# Patient Record
Sex: FEMALE | Race: BLACK OR AFRICAN AMERICAN | Employment: FULL TIME | URBAN - METROPOLITAN AREA
[De-identification: names, ages, dates, MRNs, and addresses within clinical notes are randomized per-mention and may not be internally consistent; named-entity substitution may affect disease eponyms.]

---

## 2020-02-02 ENCOUNTER — HOSPITAL ENCOUNTER (EMERGENCY)
Age: 50
Discharge: HOME | End: 2020-02-02
Attending: EMERGENCY MEDICINE
Payer: SELF-PAY

## 2020-02-02 VITALS
BODY MASS INDEX: 34.36 KG/M2 | HEART RATE: 76 BPM | SYSTOLIC BLOOD PRESSURE: 169 MMHG | DIASTOLIC BLOOD PRESSURE: 76 MMHG | RESPIRATION RATE: 18 BRPM | HEIGHT: 69 IN | WEIGHT: 232 LBS | TEMPERATURE: 97.6 F | OXYGEN SATURATION: 98 %

## 2020-02-02 DIAGNOSIS — L25.3 CONTACT DERMATITIS DUE TO CHEMICALS: Primary | ICD-10-CM

## 2020-02-02 LAB
ALBUMIN SERPL-MCNC: 3 G/DL (ref 3.5–5)
ALBUMIN/GLOB SERPL: 0.6 {RATIO} (ref 1.2–3.5)
ALP SERPL-CCNC: 139 U/L (ref 50–136)
ALT SERPL-CCNC: 19 U/L (ref 12–65)
ANION GAP SERPL CALC-SCNC: 8 MMOL/L (ref 7–16)
AST SERPL-CCNC: 31 U/L (ref 15–37)
BILIRUB SERPL-MCNC: 0.6 MG/DL (ref 0.2–1.1)
BUN SERPL-MCNC: 14 MG/DL (ref 6–23)
CALCIUM SERPL-MCNC: 8.9 MG/DL (ref 8.3–10.4)
CHLORIDE SERPL-SCNC: 109 MMOL/L (ref 98–107)
CO2 SERPL-SCNC: 24 MMOL/L (ref 21–32)
CREAT SERPL-MCNC: 1 MG/DL (ref 0.6–1)
GLOBULIN SER CALC-MCNC: 5 G/DL (ref 2.3–3.5)
GLUCOSE SERPL-MCNC: 111 MG/DL (ref 65–100)
POTASSIUM SERPL-SCNC: 4.3 MMOL/L (ref 3.5–5.1)
PROT SERPL-MCNC: 8 G/DL (ref 6.3–8.2)
SODIUM SERPL-SCNC: 141 MMOL/L (ref 136–145)

## 2020-02-02 PROCEDURE — 80053 COMPREHEN METABOLIC PANEL: CPT

## 2020-02-02 PROCEDURE — 99283 EMERGENCY DEPT VISIT LOW MDM: CPT

## 2020-02-02 PROCEDURE — 96374 THER/PROPH/DIAG INJ IV PUSH: CPT

## 2020-02-02 PROCEDURE — 96375 TX/PRO/DX INJ NEW DRUG ADDON: CPT

## 2020-02-02 PROCEDURE — 74011250636 HC RX REV CODE- 250/636: Performed by: EMERGENCY MEDICINE

## 2020-02-02 RX ORDER — KETOROLAC TROMETHAMINE 30 MG/ML
30 INJECTION, SOLUTION INTRAMUSCULAR; INTRAVENOUS
Status: COMPLETED | OUTPATIENT
Start: 2020-02-02 | End: 2020-02-02

## 2020-02-02 RX ORDER — DIPHENHYDRAMINE HYDROCHLORIDE 50 MG/ML
25 INJECTION, SOLUTION INTRAMUSCULAR; INTRAVENOUS
Status: COMPLETED | OUTPATIENT
Start: 2020-02-02 | End: 2020-02-02

## 2020-02-02 RX ORDER — HYDROCODONE BITARTRATE AND ACETAMINOPHEN 5; 325 MG/1; MG/1
1 TABLET ORAL
Qty: 12 TAB | Refills: 0 | Status: SHIPPED | OUTPATIENT
Start: 2020-02-02 | End: 2020-02-08

## 2020-02-02 RX ORDER — PREDNISONE 50 MG/1
50 TABLET ORAL DAILY
Qty: 7 TAB | Refills: 0 | Status: SHIPPED | OUTPATIENT
Start: 2020-02-02 | End: 2020-02-08

## 2020-02-02 RX ADMIN — DIPHENHYDRAMINE HYDROCHLORIDE 25 MG: 50 INJECTION, SOLUTION INTRAMUSCULAR; INTRAVENOUS at 10:58

## 2020-02-02 RX ADMIN — METHYLPREDNISOLONE SODIUM SUCCINATE 125 MG: 125 INJECTION, POWDER, FOR SOLUTION INTRAMUSCULAR; INTRAVENOUS at 10:58

## 2020-02-02 RX ADMIN — KETOROLAC TROMETHAMINE 30 MG: 30 INJECTION, SOLUTION INTRAMUSCULAR at 10:58

## 2020-02-02 NOTE — DISCHARGE INSTRUCTIONS
Patient Education        Dermatitis: Care Instructions  Your Care Instructions  Dermatitis is the general name used for any rash or inflammation of the skin. Different kinds of dermatitis cause different kinds of rashes. Common causes of a rash include new medicines, plants (such as poison oak or poison ivy), heat, and stress. Certain illnesses can also cause a rash. An allergic reaction to something that touches your skin, such as latex, nickel, or poison ivy, is called contact dermatitis. Contact dermatitis may also be caused by something that irritates the skin, such as bleach, a chemical, or soap. These types of rashes cannot be spread from person to person. How long your rash will last depends on what caused it. Rashes may last a few days or months. Follow-up care is a key part of your treatment and safety. Be sure to make and go to all appointments, and call your doctor if you are having problems. It's also a good idea to know your test results and keep a list of the medicines you take. How can you care for yourself at home? · Do not scratch the rash. Cut your nails short, and file them smooth. Or wear gloves if this helps keep you from scratching. · Wash the area with water only. Pat dry. · Put cold, wet cloths on the rash to reduce itching. · Keep cool, and stay out of the sun. · Leave the rash open to the air as much as possible. · If the rash itches, use hydrocortisone cream. Follow the directions on the label. Calamine lotion may help for plant rashes. · Take an over-the-counter antihistamine, such as diphenhydramine (Benadryl) or loratadine (Claritin), to help calm the itching. Read and follow all instructions on the label. · If your doctor prescribed a cream, use it as directed. If your doctor prescribed medicine, take it exactly as directed. When should you call for help?   Call your doctor now or seek immediate medical care if:    · You have symptoms of infection, such as:  ? Increased pain, swelling, warmth, or redness. ? Red streaks leading from the area. ? Pus draining from the area. ? A fever.     · You have joint pain along with the rash.    Watch closely for changes in your health, and be sure to contact your doctor if:    · Your rash is changing or getting worse.     · You are not getting better as expected. Where can you learn more? Go to http://javad-mg.info/. Enter [phone] in the search box to learn more about \"Dermatitis: Care Instructions. \"  Current as of: April 1, 2019  Content Version: 12.2  © 9665-9780 [employer], [employer]. Care instructions adapted under license by [employer] (which disclaims liability or warranty for this information). If you have questions about a medical condition or this instruction, always ask your healthcare professional. [address] any warranty or liability for your use of this information.

## 2020-02-02 NOTE — ED TRIAGE NOTES
Pt arrives to triage via w/c. Pt states she dyed her hair black on Friday and yesterday her head was red and swollen and put cortisone cream on her head. Pt states when she woke up this morning the swelling is worse. Pt has swelling to eyes and nose with scratchy throat. Tonsils are mildly swollen, but are absent of redness or exudate. Pt states she has never used this particular brand before. Pt states she has some blistering on her head. Pt states she has a HA.

## 2020-02-02 NOTE — ED PROVIDER NOTES
Pt arrives to triage via w/c. Pt states she dyed her hair black on Friday and yesterday her head was red and swollen and put cortisone cream on her head. Pt states when she woke up this morning the swelling is worse. Pt has swelling to eyes and nose with scratchy throat. Tonsils are mildly swollen, but are absent of redness or exudate. Pt states she has never used this particular brand before. Pt states she has some blistering on her head. Pt states she has a HA. The history is provided by the patient. Rash    This is a new problem. The current episode started 2 days ago. The problem has been gradually worsening. Associated with: hair dye. There has been no fever. The rash is present on the scalp. The pain is at a severity of 10/10. The pain is severe. The pain has been constant since onset. Associated symptoms include blisters, itching and weeping. She has tried cold cream and steroid cream for the symptoms. The treatment provided no relief. Past Medical History:   Diagnosis Date    Gastrointestinal disorder     ulcers    Hypertension        Past Surgical History:   Procedure Laterality Date    HX CHOLECYSTECTOMY      HX GYN      tubal    HX ORTHOPAEDIC      tubal ligation          No family history on file.     Social History     Socioeconomic History    Marital status: SINGLE     Spouse name: Not on file    Number of children: Not on file    Years of education: Not on file    Highest education level: Not on file   Occupational History    Not on file   Social Needs    Financial resource strain: Not on file    Food insecurity:     Worry: Not on file     Inability: Not on file    Transportation needs:     Medical: Not on file     Non-medical: Not on file   Tobacco Use    Smoking status: Current Every Day Smoker     Packs/day: 0.25   Substance and Sexual Activity    Alcohol use: Yes     Comment: social    Drug use: No    Sexual activity: Not on file   Lifestyle    Physical activity: Days per week: Not on file     Minutes per session: Not on file    Stress: Not on file   Relationships    Social connections:     Talks on phone: Not on file     Gets together: Not on file     Attends Baptism service: Not on file     Active member of club or organization: Not on file     Attends meetings of clubs or organizations: Not on file     Relationship status: Not on file    Intimate partner violence:     Fear of current or ex partner: Not on file     Emotionally abused: Not on file     Physically abused: Not on file     Forced sexual activity: Not on file   Other Topics Concern    Not on file   Social History Narrative    Not on file         ALLERGIES: Patient has no known allergies. Review of Systems   Skin: Positive for itching and rash. All other systems reviewed and are negative. Vitals:    02/02/20 1013   BP: (!) 174/93   Pulse: 74   Resp: 16   Temp: 97.5 °F (36.4 °C)   SpO2: 97%   Weight: 105.2 kg (232 lb)   Height: 5' 9\" (1.753 m)            Physical Exam  Vitals signs and nursing note reviewed. Constitutional:       General: She is not in acute distress. Appearance: Normal appearance. She is not ill-appearing, toxic-appearing or diaphoretic. HENT:      Head: Normocephalic and atraumatic. Comments: There is some mild swelling noted to the facial tissues including the upper eyelids. Right Ear: Tympanic membrane normal.      Left Ear: Tympanic membrane normal.      Mouth/Throat:      Mouth: Mucous membranes are moist.      Pharynx: Oropharynx is clear. No oropharyngeal exudate or posterior oropharyngeal erythema. Eyes:      Extraocular Movements: Extraocular movements intact. Conjunctiva/sclera: Conjunctivae normal.      Pupils: Pupils are equal, round, and reactive to light. Cardiovascular:      Rate and Rhythm: Normal rate and regular rhythm. Heart sounds: Normal heart sounds.    Pulmonary:      Effort: Pulmonary effort is normal.      Breath sounds: Normal breath sounds. Skin:     General: Skin is warm. Comments: There is generalized rash involving the scalp with raised lesions and weeping clear fluid. No evidence of cellulitis or infection. Neurological:      General: No focal deficit present. Mental Status: She is alert and oriented to person, place, and time. Mental status is at baseline.    Psychiatric:         Mood and Affect: Mood normal.         Behavior: Behavior normal.          MDM  Number of Diagnoses or Management Options     Amount and/or Complexity of Data Reviewed  Clinical lab tests: reviewed    Risk of Complications, Morbidity, and/or Mortality  Presenting problems: low  Diagnostic procedures: low  Management options: low    Patient Progress  Patient progress: stable         Procedures

## 2020-02-02 NOTE — ED NOTES
I have reviewed discharge instructions with the patient. The patient verbalized understanding. Patient left ED via Discharge Method: ambulatory to Home with friend. Opportunity for questions and clarification provided. Patient given 2 scripts. To continue your aftercare when you leave the hospital, you may receive an automated call from our care team to check in on how you are doing. This is a free service and part of our promise to provide the best care and service to meet your aftercare needs.  If you have questions, or wish to unsubscribe from this service please call [phone]. Thank you for Choosing our [facility] Emergency Department.

## 2020-02-04 ENCOUNTER — APPOINTMENT (OUTPATIENT)
Dept: CT IMAGING | Age: 50
DRG: 066 | End: 2020-02-04
Attending: EMERGENCY MEDICINE
Payer: SELF-PAY

## 2020-02-04 ENCOUNTER — HOSPITAL ENCOUNTER (INPATIENT)
Age: 50
LOS: 2 days | Discharge: HOME HEALTH | DRG: 066 | End: 2020-02-08
Attending: EMERGENCY MEDICINE | Admitting: FAMILY MEDICINE
Payer: SELF-PAY

## 2020-02-04 DIAGNOSIS — I63.9 BRAINSTEM STROKE (HCC): ICD-10-CM

## 2020-02-04 DIAGNOSIS — R11.2 NON-INTRACTABLE VOMITING WITH NAUSEA, UNSPECIFIED VOMITING TYPE: ICD-10-CM

## 2020-02-04 DIAGNOSIS — I16.0 HYPERTENSIVE URGENCY: Primary | ICD-10-CM

## 2020-02-04 DIAGNOSIS — R42 VERTIGO: ICD-10-CM

## 2020-02-04 PROCEDURE — 96372 THER/PROPH/DIAG INJ SC/IM: CPT

## 2020-02-04 PROCEDURE — 99285 EMERGENCY DEPT VISIT HI MDM: CPT

## 2020-02-04 PROCEDURE — 93005 ELECTROCARDIOGRAM TRACING: CPT | Performed by: EMERGENCY MEDICINE

## 2020-02-04 PROCEDURE — 81003 URINALYSIS AUTO W/O SCOPE: CPT

## 2020-02-04 PROCEDURE — 74011250636 HC RX REV CODE- 250/636: Performed by: EMERGENCY MEDICINE

## 2020-02-04 PROCEDURE — 83690 ASSAY OF LIPASE: CPT

## 2020-02-04 PROCEDURE — 80053 COMPREHEN METABOLIC PANEL: CPT

## 2020-02-04 PROCEDURE — 85025 COMPLETE CBC W/AUTO DIFF WBC: CPT

## 2020-02-04 PROCEDURE — 70450 CT HEAD/BRAIN W/O DYE: CPT

## 2020-02-04 RX ORDER — MECLIZINE HYDROCHLORIDE 25 MG/1
50 TABLET ORAL
Status: COMPLETED | OUTPATIENT
Start: 2020-02-04 | End: 2020-02-04

## 2020-02-04 RX ORDER — PROMETHAZINE HYDROCHLORIDE 25 MG/ML
25 INJECTION, SOLUTION INTRAMUSCULAR; INTRAVENOUS
Status: COMPLETED | OUTPATIENT
Start: 2020-02-04 | End: 2020-02-04

## 2020-02-04 RX ADMIN — PROMETHAZINE HYDROCHLORIDE 25 MG: 25 INJECTION INTRAMUSCULAR; INTRAVENOUS at 23:59

## 2020-02-04 RX ADMIN — SODIUM CHLORIDE 1000 ML: 900 INJECTION, SOLUTION INTRAVENOUS at 23:59

## 2020-02-04 RX ADMIN — MECLIZINE HYDROCHLORIDE 50 MG: 25 TABLET ORAL at 23:59

## 2020-02-05 ENCOUNTER — APPOINTMENT (OUTPATIENT)
Dept: CT IMAGING | Age: 50
DRG: 066 | End: 2020-02-05
Attending: INTERNAL MEDICINE
Payer: SELF-PAY

## 2020-02-05 ENCOUNTER — APPOINTMENT (OUTPATIENT)
Dept: MRI IMAGING | Age: 50
DRG: 066 | End: 2020-02-05
Attending: INTERNAL MEDICINE
Payer: SELF-PAY

## 2020-02-05 PROBLEM — I63.9 CVA (CEREBRAL VASCULAR ACCIDENT) (HCC): Status: ACTIVE | Noted: 2020-02-05

## 2020-02-05 LAB
ALBUMIN SERPL-MCNC: 3.6 G/DL (ref 3.5–5)
ALBUMIN/GLOB SERPL: 0.8 {RATIO} (ref 1.2–3.5)
ALP SERPL-CCNC: 145 U/L (ref 50–136)
ALT SERPL-CCNC: 33 U/L (ref 12–65)
ANION GAP SERPL CALC-SCNC: 8 MMOL/L (ref 7–16)
AST SERPL-CCNC: 21 U/L (ref 15–37)
ATRIAL RATE: 59 BPM
BASOPHILS # BLD: 0 K/UL (ref 0–0.2)
BASOPHILS NFR BLD: 0 % (ref 0–2)
BILIRUB SERPL-MCNC: 0.4 MG/DL (ref 0.2–1.1)
BUN SERPL-MCNC: 17 MG/DL (ref 6–23)
CALCIUM SERPL-MCNC: 9.4 MG/DL (ref 8.3–10.4)
CALCULATED P AXIS, ECG09: 22 DEGREES
CALCULATED R AXIS, ECG10: 46 DEGREES
CALCULATED T AXIS, ECG11: 133 DEGREES
CHLORIDE SERPL-SCNC: 103 MMOL/L (ref 98–107)
CO2 SERPL-SCNC: 28 MMOL/L (ref 21–32)
CREAT SERPL-MCNC: 0.92 MG/DL (ref 0.6–1)
DIAGNOSIS, 93000: NORMAL
DIFFERENTIAL METHOD BLD: ABNORMAL
EOSINOPHIL # BLD: 0 K/UL (ref 0–0.8)
EOSINOPHIL NFR BLD: 0 % (ref 0.5–7.8)
ERYTHROCYTE [DISTWIDTH] IN BLOOD BY AUTOMATED COUNT: 16.4 % (ref 11.9–14.6)
GLOBULIN SER CALC-MCNC: 4.8 G/DL (ref 2.3–3.5)
GLUCOSE BLD STRIP.AUTO-MCNC: 108 MG/DL (ref 65–100)
GLUCOSE BLD STRIP.AUTO-MCNC: 119 MG/DL (ref 65–100)
GLUCOSE BLD STRIP.AUTO-MCNC: 125 MG/DL (ref 65–100)
GLUCOSE SERPL-MCNC: 141 MG/DL (ref 65–100)
HCT VFR BLD AUTO: 40.9 % (ref 35.8–46.3)
HGB BLD-MCNC: 13.1 G/DL (ref 11.7–15.4)
IMM GRANULOCYTES # BLD AUTO: 0.1 K/UL (ref 0–0.5)
IMM GRANULOCYTES NFR BLD AUTO: 1 % (ref 0–5)
LIPASE SERPL-CCNC: 75 U/L (ref 73–393)
LYMPHOCYTES # BLD: 1.6 K/UL (ref 0.5–4.6)
LYMPHOCYTES NFR BLD: 15 % (ref 13–44)
MCH RBC QN AUTO: 28.2 PG (ref 26.1–32.9)
MCHC RBC AUTO-ENTMCNC: 32 G/DL (ref 31.4–35)
MCV RBC AUTO: 88 FL (ref 79.6–97.8)
MONOCYTES # BLD: 0.3 K/UL (ref 0.1–1.3)
MONOCYTES NFR BLD: 3 % (ref 4–12)
NEUTS SEG # BLD: 8.6 K/UL (ref 1.7–8.2)
NEUTS SEG NFR BLD: 81 % (ref 43–78)
NRBC # BLD: 0 K/UL (ref 0–0.2)
P-R INTERVAL, ECG05: 202 MS
PLATELET # BLD AUTO: 253 K/UL (ref 150–450)
PMV BLD AUTO: 10.6 FL (ref 9.4–12.3)
POTASSIUM SERPL-SCNC: 3.3 MMOL/L (ref 3.5–5.1)
PROT SERPL-MCNC: 8.4 G/DL (ref 6.3–8.2)
Q-T INTERVAL, ECG07: 470 MS
QRS DURATION, ECG06: 96 MS
QTC CALCULATION (BEZET), ECG08: 465 MS
RBC # BLD AUTO: 4.65 M/UL (ref 4.05–5.2)
SODIUM SERPL-SCNC: 139 MMOL/L (ref 136–145)
VENTRICULAR RATE, ECG03: 59 BPM
WBC # BLD AUTO: 10.6 K/UL (ref 4.3–11.1)

## 2020-02-05 PROCEDURE — 74011250637 HC RX REV CODE- 250/637: Performed by: FAMILY MEDICINE

## 2020-02-05 PROCEDURE — 99218 HC RM OBSERVATION: CPT

## 2020-02-05 PROCEDURE — 70551 MRI BRAIN STEM W/O DYE: CPT

## 2020-02-05 PROCEDURE — 74011250636 HC RX REV CODE- 250/636: Performed by: INTERNAL MEDICINE

## 2020-02-05 PROCEDURE — 74011250637 HC RX REV CODE- 250/637: Performed by: INTERNAL MEDICINE

## 2020-02-05 PROCEDURE — 82962 GLUCOSE BLOOD TEST: CPT

## 2020-02-05 PROCEDURE — 74011000258 HC RX REV CODE- 258: Performed by: INTERNAL MEDICINE

## 2020-02-05 PROCEDURE — 97166 OT EVAL MOD COMPLEX 45 MIN: CPT

## 2020-02-05 PROCEDURE — 74011636320 HC RX REV CODE- 636/320: Performed by: INTERNAL MEDICINE

## 2020-02-05 PROCEDURE — 70496 CT ANGIOGRAPHY HEAD: CPT

## 2020-02-05 PROCEDURE — 97162 PT EVAL MOD COMPLEX 30 MIN: CPT

## 2020-02-05 PROCEDURE — 96374 THER/PROPH/DIAG INJ IV PUSH: CPT

## 2020-02-05 PROCEDURE — 92610 EVALUATE SWALLOWING FUNCTION: CPT

## 2020-02-05 PROCEDURE — 74011250636 HC RX REV CODE- 250/636: Performed by: EMERGENCY MEDICINE

## 2020-02-05 PROCEDURE — 93306 TTE W/DOPPLER COMPLETE: CPT

## 2020-02-05 PROCEDURE — 96372 THER/PROPH/DIAG INJ SC/IM: CPT

## 2020-02-05 PROCEDURE — 99204 OFFICE O/P NEW MOD 45 MIN: CPT | Performed by: PSYCHIATRY & NEUROLOGY

## 2020-02-05 RX ORDER — AMLODIPINE BESYLATE 10 MG/1
10 TABLET ORAL DAILY
Status: DISCONTINUED | OUTPATIENT
Start: 2020-02-06 | End: 2020-02-08 | Stop reason: HOSPADM

## 2020-02-05 RX ORDER — ENOXAPARIN SODIUM 100 MG/ML
40 INJECTION SUBCUTANEOUS EVERY 24 HOURS
Status: DISCONTINUED | OUTPATIENT
Start: 2020-02-05 | End: 2020-02-08 | Stop reason: HOSPADM

## 2020-02-05 RX ORDER — HYDRALAZINE HYDROCHLORIDE 50 MG/1
75 TABLET, FILM COATED ORAL 3 TIMES DAILY
COMMUNITY

## 2020-02-05 RX ORDER — CHLORTHALIDONE 25 MG/1
25 TABLET ORAL DAILY
COMMUNITY

## 2020-02-05 RX ORDER — ATORVASTATIN CALCIUM 80 MG/1
80 TABLET, FILM COATED ORAL
COMMUNITY

## 2020-02-05 RX ORDER — SODIUM CHLORIDE 0.9 % (FLUSH) 0.9 %
5-40 SYRINGE (ML) INJECTION EVERY 8 HOURS
Status: DISCONTINUED | OUTPATIENT
Start: 2020-02-05 | End: 2020-02-08 | Stop reason: HOSPADM

## 2020-02-05 RX ORDER — SODIUM CHLORIDE 0.9 % (FLUSH) 0.9 %
5-40 SYRINGE (ML) INJECTION AS NEEDED
Status: DISCONTINUED | OUTPATIENT
Start: 2020-02-05 | End: 2020-02-08 | Stop reason: HOSPADM

## 2020-02-05 RX ORDER — SODIUM CHLORIDE 0.9 % (FLUSH) 0.9 %
10 SYRINGE (ML) INJECTION
Status: COMPLETED | OUTPATIENT
Start: 2020-02-05 | End: 2020-02-05

## 2020-02-05 RX ORDER — ASPIRIN 325 MG
325 TABLET ORAL ONCE
Status: COMPLETED | OUTPATIENT
Start: 2020-02-05 | End: 2020-02-05

## 2020-02-05 RX ORDER — SODIUM CHLORIDE 0.9 % (FLUSH) 0.9 %
10 SYRINGE (ML) INJECTION
Status: ACTIVE | OUTPATIENT
Start: 2020-02-05 | End: 2020-02-05

## 2020-02-05 RX ORDER — GUAIFENESIN 100 MG/5ML
81 LIQUID (ML) ORAL DAILY
Status: DISCONTINUED | OUTPATIENT
Start: 2020-02-06 | End: 2020-02-08 | Stop reason: HOSPADM

## 2020-02-05 RX ORDER — ACETAMINOPHEN 325 MG/1
650 TABLET ORAL
Status: DISCONTINUED | OUTPATIENT
Start: 2020-02-05 | End: 2020-02-08 | Stop reason: HOSPADM

## 2020-02-05 RX ORDER — ATORVASTATIN CALCIUM 40 MG/1
40 TABLET, FILM COATED ORAL
Status: DISCONTINUED | OUTPATIENT
Start: 2020-02-05 | End: 2020-02-08 | Stop reason: HOSPADM

## 2020-02-05 RX ORDER — LOSARTAN POTASSIUM 50 MG/1
100 TABLET ORAL DAILY
Status: DISCONTINUED | OUTPATIENT
Start: 2020-02-06 | End: 2020-02-08 | Stop reason: HOSPADM

## 2020-02-05 RX ORDER — HYDRALAZINE HYDROCHLORIDE 20 MG/ML
20 INJECTION INTRAMUSCULAR; INTRAVENOUS
Status: COMPLETED | OUTPATIENT
Start: 2020-02-05 | End: 2020-02-05

## 2020-02-05 RX ORDER — LABETALOL HYDROCHLORIDE 5 MG/ML
5 INJECTION, SOLUTION INTRAVENOUS
Status: DISCONTINUED | OUTPATIENT
Start: 2020-02-05 | End: 2020-02-08 | Stop reason: HOSPADM

## 2020-02-05 RX ORDER — CARVEDILOL 25 MG/1
25 TABLET ORAL 2 TIMES DAILY WITH MEALS
Status: DISCONTINUED | OUTPATIENT
Start: 2020-02-05 | End: 2020-02-08 | Stop reason: HOSPADM

## 2020-02-05 RX ORDER — CHLORTHALIDONE 25 MG/1
25 TABLET ORAL DAILY
Status: DISCONTINUED | OUTPATIENT
Start: 2020-02-06 | End: 2020-02-08 | Stop reason: HOSPADM

## 2020-02-05 RX ORDER — CARVEDILOL 25 MG/1
25 TABLET ORAL 2 TIMES DAILY WITH MEALS
COMMUNITY

## 2020-02-05 RX ORDER — AMLODIPINE BESYLATE 10 MG/1
10 TABLET ORAL DAILY
COMMUNITY

## 2020-02-05 RX ORDER — LOSARTAN POTASSIUM 100 MG/1
100 TABLET ORAL DAILY
COMMUNITY

## 2020-02-05 RX ORDER — HYDROCODONE BITARTRATE AND ACETAMINOPHEN 5; 325 MG/1; MG/1
1 TABLET ORAL
Status: DISCONTINUED | OUTPATIENT
Start: 2020-02-05 | End: 2020-02-08 | Stop reason: HOSPADM

## 2020-02-05 RX ADMIN — Medication 10 ML: at 06:18

## 2020-02-05 RX ADMIN — ENOXAPARIN SODIUM 40 MG: 40 INJECTION SUBCUTANEOUS at 06:06

## 2020-02-05 RX ADMIN — Medication 10 ML: at 21:16

## 2020-02-05 RX ADMIN — Medication 10 ML: at 08:21

## 2020-02-05 RX ADMIN — ATORVASTATIN CALCIUM 40 MG: 40 TABLET, FILM COATED ORAL at 21:15

## 2020-02-05 RX ADMIN — HYDRALAZINE HYDROCHLORIDE 75 MG: 50 TABLET, FILM COATED ORAL at 21:15

## 2020-02-05 RX ADMIN — SODIUM CHLORIDE 100 ML: 900 INJECTION, SOLUTION INTRAVENOUS at 08:22

## 2020-02-05 RX ADMIN — IOPAMIDOL 50 ML: 755 INJECTION, SOLUTION INTRAVENOUS at 08:22

## 2020-02-05 RX ADMIN — ASPIRIN 325 MG ORAL TABLET 325 MG: 325 PILL ORAL at 06:06

## 2020-02-05 RX ADMIN — Medication 10 ML: at 14:00

## 2020-02-05 RX ADMIN — HYDRALAZINE HYDROCHLORIDE 20 MG: 20 INJECTION INTRAMUSCULAR; INTRAVENOUS at 00:59

## 2020-02-05 RX ADMIN — CARVEDILOL 25 MG: 25 TABLET, FILM COATED ORAL at 18:52

## 2020-02-05 NOTE — PROGRESS NOTES
Problem: Self Care Deficits Care Plan (Adult)  Goal: *Acute Goals and Plan of Care (Insert Text)  Description  LTG 1: Pt will be I with toileting within 6 visits to prevent skin breakdown. LTG 2: Pt will be I with LB dressing within 6 visits to reduce risk of falls. LTG 3: Pt will be I with bathing within 6 visits to promote good skin integrity. LTG 4: Pt will be I with toilet transfers within 6 visits to promote quality of life. LTG 5: Pt will be be able to stand for 5 minutes with no medical concerns within 6 visits to participate in simple IADL. OCCUPATIONAL THERAPY: Initial Assessment 2/5/2020  OBSERVATION: OT Visit Days: (0)  Payor: SELF PAY / Plan: [facility] SELF PAY / Product Type: Self Pay /      NAME/AGE/GENDER: Nicolas Jaquez is a 44 y.o. female   PRIMARY DIAGNOSIS:  CVA (cerebral vascular accident) ([facility] [address] [I63.9] <principal problem not specified> <principal problem not specified>        ICD-10: Treatment Diagnosis:    Generalized Muscle Weakness (M62.81)   Precautions/Allergies:     Patient has no known allergies. ASSESSMENT:     Ms. Adebayo Nielson presents in bed and agreeable to tx. Pt's RN mentioned her BP increased with movement. Pt's lives with her son and was working until a week ago when her assignment ended. She was I with all self-care, driving, and medication management. Pt's BP in sitting was 179/89. In sitting MMT completed with generalized strength of 4/5 with LUE coordination with slight deficit. Pt reported double vision in sitting. BP retested at 207/146. Standing deferred at this time. Pt could benefit from skilled services to address coordination deficits and address standing when appropriate.      This section established at most recent assessment   PROBLEM LIST (Impairments causing functional limitations):  Decreased ADL/Functional Activities  Decreased Ambulation Ability/Technique  Decreased Balance   INTERVENTIONS PLANNED: (Benefits and precautions of occupational therapy have been discussed with the patient.)  Activities of daily living training  Neuromuscular re-eduation  Therapeutic activity  Therapeutic exercise     TREATMENT PLAN: Frequency/Duration: Follow patient 3x a week to address above goals. Rehabilitation Potential For Stated Goals: Excellent     REHAB RECOMMENDATIONS (at time of discharge pending progress):    Placement: It is my opinion, based on this patient's performance to date, that Ms. Kyle Trinidad may benefit from participating in 1-2 additional therapy sessions in order to continue to assess for rehab potential and then make recommendation for disposition at discharge. Equipment:   None at this time              OCCUPATIONAL PROFILE AND HISTORY:   History of Present Injury/Illness (Reason for Referral):  See H&P  Past Medical History/Comorbidities:   Ms. Kyle Trinidad  has a past medical history of CVA (cerebral vascular accident) ([facility] [address] (2/5/2020), Gastrointestinal disorder, and Hypertension. She also has no past medical history of CAD (coronary artery disease), Cancer ([facility] [address] Chronic kidney disease, or Diabetes ([facility] [address] Ms. Kyle Trinidad  has a past surgical history that includes hx orthopaedic; hx cholecystectomy; and hx gyn. Social History/Living Environment:   Home Environment: Private residence  # Steps to Enter: 3  Rails to Enter: Yes  Hand Rails : Bilateral  One/Two Story Residence: One story  Living Alone: No  Support Systems: Child(prabhjot)  Patient Expects to be Discharged to[de-identified] (Unknown)  Current DME Used/Available at Home: Walker  Prior Level of Function/Work/Activity:  Pt was I and working until a week ago when her assignment ended.     Number of Personal Factors/Comorbidities that affect the Plan of Care: Expanded review of therapy/medical records (1-2):  MODERATE COMPLEXITY   ASSESSMENT OF OCCUPATIONAL PERFORMANCE[de-identified]   Activities of Daily Living:   Basic ADLs (From Assessment) Complex ADLs (From Assessment)   Feeding: Independent  Oral Facial Hygiene/Grooming: Setup  Bathing: Moderate assistance  Upper Body Dressing: Setup  Lower Body Dressing: Moderate assistance  Toileting: Total assistance     Grooming/Bathing/Dressing Activities of Daily Living                             Bed/Mat Mobility  Supine to Sit: Independent  Scooting: Independent     Most Recent Physical Functioning:   Gross Assessment:  AROM: Within functional limits  PROM: Within functional limits  Strength: Within functional limits  Coordination: Generally decreased, functional  Tone: Normal  Sensation: Intact               Posture:     Balance:  Sitting: Intact  Standing: (Unable to test secondary to BP) Bed Mobility:  Supine to Sit: Independent  Scooting: Independent  Wheelchair Mobility:     Transfers:               Patient Vitals for the past 6 hrs:   BP SpO2 Pulse   20 0911 (!) 219/108 96 % 73   20 0919 (!) 191/92 -- --   20 0923 (!) 180/92 98 % 79   20 1156 (!) 182/91 98 % 72                                  Physical Skills Involved: Activity Tolerance  Fine Motor Control  Gross Motor Control  Vision Cognitive Skills Affected (resulting in the inability to perform in a timely and safe manner):  N/A  Psychosocial Skills Affected:  N/A    Number of elements that affect the Plan of Care: 3-5:  MODERATE COMPLEXITY   CLINICAL DECISION [facility] hospitals [address] [id number] AM-PAC 6 Clicks   Daily Activity Inpatient Short Form  How much help from another person does the patient currently need. .. Total A Lot A Little None   1. Putting on and taking off regular lower body clothing? [] 1   [x] 2   [] 3   [] 4   2. Bathing (including washing, rinsing, drying)? [x] 1   [] 2   [] 3   [] 4   3. Toileting, which includes using toilet, bedpan or urinal?   [x] 1   [] 2   [] 3   [] 4   4. Putting on and taking off regular upper body clothing? [] 1   [] 2   [x] 3   [] 4   5. Taking care of personal grooming such as brushing teeth? [] 1   [] 2   [x] 3   [] 4   6. Eating meals?    [] 1   [] 2   [x] 3   [] 4   © 2007, Trustees of [address] [place] [address] [address] [address], under license to [employer]. All rights reserved      Score:  Initial: 13 Most Recent: X (Date: -- )    Interpretation of Tool:  Represents activities that are increasingly more difficult (i.e. Bed mobility, Transfers, Gait). Medical Necessity:     Skilled intervention continues to be required due to inability to stand safely. Reason for Services/Other Comments:  Patient continues to require skilled intervention due to   Need to return home   . Use of outcome tool(s) and clinical judgement create a POC that gives a: MODERATE COMPLEXITY         TREATMENT:   (In addition to Assessment/Re-Assessment sessions the following treatments were rendered)     Pre-treatment Symptoms/Complaints:    Pain: Initial:     No c/o Post Session:  No c/o     Assessment/Reassessment only, no treatment provided today    Braces/Orthotics/Lines/Etc:   O2 Device: Room air  Treatment/Session Assessment:    Response to Treatment:  Agreeable  Interdisciplinary Collaboration:   Registered Nurse  After treatment position/precautions:   Bed/Chair-wheels locked  Bed in low position  Call light within reach  Side rails x 2   Compliance with Program/Exercises: Will assess as treatment progresses. Recommendations/Intent for next treatment session: \"Next visit will focus on advancements to more challenging activities and reduction in assistance provided\".   Total Treatment Duration:  OT Patient Time In/Time Out  Time In: 1114  Time Out: [address], OT

## 2020-02-05 NOTE — PROGRESS NOTES
Admitted after midnight with stroke-like symptoms including dizziness and nystagmus. Seen and examined. Symptoms improving, still present. Seen by neurology, appreciate evaluation. MRI negative for stroke, plan possible repeat with contrast if sx do not resolve. Restart home antihypertensives. Skin reaction improved, will stop benadryl and steroid per pt preference unless worsens.

## 2020-02-05 NOTE — PROGRESS NOTES
Problem: Mobility Impaired (Adult and Pediatric)  Goal: *Acute Goals and Plan of Care (Insert Text)  Description  LTG:  (1.)Ms. Linda Munoz will move from supine to sit and sit to supine, scoot up and down and roll side to side INDEPENDENTLY with bed flat within 7 treatment day(s). (2.)Ms. Linda Munoz will transfer from bed to chair and chair to bed INDEPENDENTLY within 7 treatment day(s). (3.)Ms. Fernando will ambulate with SUPERVISION for 250+ feet with the least restrictive device within 7 treatment day(s). (4.)Ms. Fernando will ascend and descend 4 steps with STAND BY ASSIST using handrail(s) within 7 days. ________________________________________________________________________________________________   Outcome: Progressing Towards Goal     PHYSICAL THERAPY: Initial Assessment and PM 2/5/2020  OBSERVATION:    Payor: SELF PAY / Plan: [facility] SELF PAY / Product Type: Self Pay /       NAME/AGE/GENDER: Rae Gonzales is a 44 y.o. female   PRIMARY DIAGNOSIS: CVA (cerebral vascular accident) ([facility] [I63.9] <principal problem not specified> <principal problem not specified>        ICD-10: Treatment Diagnosis:    Generalized Muscle Weakness (M62.81)  Difficulty in walking, Not elsewhere classified (R26.2)  Other abnormalities of gait and mobility (R26.89)   Precaution/Allergies:  Patient has no known allergies. ASSESSMENT:     Ms. Linda Munoz is a 44year old female admitted from home for workup due to acute onset of dizziness, blurred vision. Was very hypertensive upon admission. At baseline she is fully independent with mobility, ambulation, and ADLs. Presents in supine without complaints and is agreeable to therapy evaluation, limited mobility. Dizziness and blurred vision present in supine. Transfers to sitting with supervision. Intact seated balance, reports dizziness unchanged. /93. LE assessment shows AROM WFL bilaterally with strength 4+/5, sensation intact and equal B LEs.  Min assist for sit-stand transfer and constant min assist for safety during standing. Keeps eyes closed stating this helps dizziness. Pt declined further mobility at this time due to continued symptoms and feeling poorly overall. Returned to supine with supervision, left with needs in reach, RN notified. Aline Siu is functioning well below baseline with mobility and is most limited by acute onset of dizziness/blurred vision. Unclear dc needs pending progress with therapy and resolution of symptoms. This section established at most recent assessment   PROBLEM LIST (Impairments causing functional limitations):  Decreased Strength  Decreased Transfer Abilities  Decreased Ambulation Ability/Technique  Decreased Balance  Decreased Activity Tolerance   INTERVENTIONS PLANNED: (Benefits and precautions of physical therapy have been discussed with the patient.)  Balance Exercise  Bed Mobility  Gait Training  Home Exercise Program (HEP)  Therapeutic Activites  Therapeutic Exercise/Strengthening  Transfer Training     TREATMENT PLAN: Frequency/Duration: 3 times a week for duration of hospital stay  Rehabilitation Potential For Stated Goals: Good     REHAB RECOMMENDATIONS (at time of discharge pending progress):    Placement: It is my opinion, based on this patient's performance to date, that Ms. Sri Amaral may benefit from participating in 1-2 additional therapy sessions in order to continue to assess for rehab potential and then make recommendation for disposition at discharge. Equipment:   Tbd pending progress               HISTORY:   History of Present Injury/Illness (Reason for Referral):  Per H&P, \"The patient is a 24-year-old female with a past medical history of hypertension who presents emergency department complaining of dizziness. Of note patient reported to ED yesterday with symptoms of allergic reaction to hair dye, and was discharged on oral prednisone.   Patient reports she felt mildly dizzy after allergic reaction but notices significant increase in symptoms today at 5:30 PM.  Patient reports at 5:30 PM she has felt suddenly hot and sweaty. She reported she felt immediately much more dizzy and off-balance. She reports she feels like she cannot walk. She reports she feels very unsteady on her feet. She reports it feels like the room is spinning. She reports she has been feeling nauseous and has had one episode of emesis. She reports episode of double vision earlier. She denies any blood in emesis. She reports she has been eating. She reports she feels mildly short of breath. She reports increased urinary frequency. Patient denies any chest pain, palpitations, myalgias, arthralgias, weakness, numbness, or tingling\"    Past Medical History/Comorbidities:   Ms. Josh Hernandez  has a past medical history of CVA (cerebral vascular accident) ([facility] (2/5/2020), Gastrointestinal disorder, and Hypertension. She also has no past medical history of CAD (coronary artery disease), Cancer ([facility] Chronic kidney disease, or Diabetes ([facility] Ms. Josh Hernandez  has a past surgical history that includes hx orthopaedic; hx cholecystectomy; and hx gyn. Social History/Living Environment:   Home Environment: Private residence  # Steps to Enter: 4  Rails to Enter: Yes  Hand Rails : Bilateral  Wheelchair Ramp: No  One/Two Story Residence: One story  Living Alone: No  Support Systems: Child(prabhjot)  Patient Expects to be Discharged to[de-identified] Private residence  Current DME Used/Available at Home: Walker  Prior Level of Function/Work/Activity:  Lives with son. Fully independent at baseline. Drives, works, denies DME use, no falls.       Number of Personal Factors/Comorbidities that affect the Plan of Care: 1-2: MODERATE COMPLEXITY   EXAMINATION:   Most Recent Physical Functioning:   Gross Assessment:  AROM: Within functional limits  PROM: Within functional limits  Strength: Generally decreased, functional  Coordination: Generally decreased, functional  Sensation: Intact Posture:  Posture (WDL): Exceptions to WDL  Posture Assessment: Forward head, Rounded shoulders, Trunk flexion  Balance:  Sitting: Intact  Standing: Impaired  Standing - Static: Fair(-)  Standing - Dynamic : Fair(-) Bed Mobility:  Rolling: Supervision  Supine to Sit: Supervision  Sit to Supine: Supervision  Scooting: Supervision  Wheelchair Mobility:     Transfers:  Sit to Stand: Minimum assistance  Stand to Sit: Minimum assistance  Bed to Chair: Minimum assistance  Gait:            Body Structures Involved:  Eyes and Ears Body Functions Affected:  Neuromusculoskeletal  Movement Related Activities and Participation Affected:  General Tasks and Demands  Mobility  Domestic Life  Community, Social and Civic Life   Number of elements that affect the Plan of Care: 4+: HIGH COMPLEXITY   CLINICAL PRESENTATION:   Presentation: Evolving clinical presentation with changing clinical characteristics: MODERATE COMPLEXITY   CLINICAL DECISION MAKIN [facility] Inpatient Short Form  How much difficulty does the patient currently have. .. Unable A Lot A Little None   1. Turning over in bed (including adjusting bedclothes, sheets and blankets)? [] 1   [] 2   [] 3   [x] 4   2. Sitting down on and standing up from a chair with arms ( e.g., wheelchair, bedside commode, etc.)   [] 1   [] 2   [x] 3   [] 4   3. Moving from lying on back to sitting on the side of the bed? [] 1   [] 2   [] 3   [x] 4   How much help from another person does the patient currently need. .. Total A Lot A Little None   4. Moving to and from a bed to a chair (including a wheelchair)? [] 1   [] 2   [x] 3   [] 4   5. Need to walk in hospital room? [] 1   [x] 2   [] 3   [] 4   6. Climbing 3-5 steps with a railing? [] 1   [x] 2   [] 3   [] 4   © , Trustees of [address] [place] [address] [address] [address], under license to [employer]  All rights reserved      Score:  Initial: 18 Most Recent: X (Date: -- )    Interpretation of Tool:  Represents activities that are increasingly more difficult (i.e. Bed mobility, Transfers, Gait). Medical Necessity:     Patient demonstrates   good   rehab potential due to higher previous functional level. Reason for Services/Other Comments:  Patient   continues to demonstrate capacity to improve strength, mobility, balance, transfers, and activity tolerance which will   increase independence, decrease amount of assistance required from caregiver, and increase safety  . Use of outcome tool(s) and clinical judgement create a POC that gives a: Questionable prediction of patient's progress: MODERATE COMPLEXITY            TREATMENT:   (In addition to Assessment/Re-Assessment sessions the following treatments were rendered)   Pre-treatment Symptoms/Complaints:  \"I would like to lay back down\"  Pain: Initial:   Pain Intensity 1: 0  Post Session:  0/10     Assessment/Reassessment only, no treatment provided today    Braces/Orthotics/Lines/Etc:   O2 Device: Room air  Treatment/Session Assessment:    Response to Treatment:  pt performs bed mobility with supervision, min A sit-stand. Did not ambulate d/t dizziness  Interdisciplinary Collaboration:   Physical Therapist  Registered Nurse  After treatment position/precautions:   Supine in bed  Bed/Chair-wheels locked  Bed in low position  Call light within reach  RN notified   Compliance with Program/Exercises: Will assess as treatment progresses  Recommendations/Intent for next treatment session: \"Next visit will focus on advancements to more challenging activities and reduction in assistance provided\".   Total Treatment Duration:  PT Patient Time In/Time Out  Time In: 1320  Time Out: [address], DPT

## 2020-02-05 NOTE — MED STUDENT NOTES
ATTENTION: 
MEDICAL STUDENT NOTE 
NOT FOR CLINICAL USE 
SEE ATTENDING NOTES Medical Student Progress Note SUBJECTIVE:  
Ibrahima Hess is a 44 y.o. female with a history of HTN who was admitted on 2/4/2020 for dizziness and nausea. She was first seen on 02/02 due to an allergic reaction on her head and face to a new hair dye. She presented on 02/02 with head blisters, swelling, eye swelling, and itchy throat. She was treated with toradol and prednisone and discharged home with prednisone and benadryl. The swelling improved, but on 02/04 she was sitting on her couch at home when she suddenly felt hot, started sweating profusely, had dizziness, and double/blurry vision. Also admitted to nausea, vomiting (no blood), headaches, difficulty walking due to dizziness, urinary frequency, increased thirst, and marijuana use 3-4x/week. Denied CP, SOB, abdominal pain, diarrhea, melena, alcohol intake. 02/05/20: Still having double and blurry vision, dizziness. ROS: negative except for positives above OBJECTIVE Visit Vitals BP (!) 180/92 Comment: reassessed L/arm Pulse 79 Temp 97.4 °F (36.3 °C) Resp 18 Ht 5' 9.5\" (1.765 m) Wt 105.2 kg (232 lb) SpO2 98% BMI 33.77 kg/m² General: Moderate distress HEENT: Blisters noted on forehead; horizontal nystagmus; responded \"four\" when two fingers were shown and asked how many she could see; PERRL Lungs:  CTAB Heart:  RRR Abdomen: Soft, non-tender, non-distended Extremities: No edema, pulses x4 Skin  Warm and dry Neurologic:  Alert and oriented; CN V, VII-XII grossly intact Pertinent labs, radiology: 
Troponin negative CT head, CTA head and neck, MRI brain - negative for infarction, stenosis, occlusion, aneurysms; nonspecific white matter hyperintensities seen on MRI 
 
ASSESSMENT/PLAN Ibrahima Hess is a 44 y.o. female with a history of HTN who was admitted on 2/4/2020 for dizziness and nausea. Dizziness Neurology consult Low salt diet Zofran PRN Hypertensive emergency Permissive HTN until 24 hours of symptom onset Labetalol PRN for SBP >220 or DBP >120 After 24 hours of symptom onset, restart home meds: 
Hyralazine 75 mg TID Lipitor 80 mg daily Coreg 25 mg BID Chlorthalidone 25 mg daily Norvasc 10 mg daily Cozaar 100 mg daily Clonidine 0.2 mg BID Allergic reaction Benadryl q daily Hold prednisone Urinary frequency UA Signed By: Nicky Edgar February 5, 2020 ATTENTION: 
MEDICAL STUDENT NOTE 
NOT FOR CLINICAL USE 
SEE ATTENDING NOTES *ATTENTION:  This note has been created by a medical student for educational purposes only. Please do not refer to the content of this note for clinical decision-making, billing, or other purposes. Please see attending physicians note to obtain clinical information on this patient. *

## 2020-02-05 NOTE — ROUTINE PROCESS
predniSONE (DELTASONE) tablet 50 mg   
 
Patient refusing to take. States \"It makes it harder to breath, don't feel right. \" 
 
Patient still has some swelling to eyes, denies any other swelling or discomfort. No swelling to mouth or throat noted.

## 2020-02-05 NOTE — PROGRESS NOTES
Care Management Interventions  PCP Verified by CM: Yes(Pt goes to [facility])  Mode of Transport at Discharge: Other (see comment)(family)  Transition of Care Consult (CM Consult): Discharge Planning  Discharge Durable Medical Equipment: No  Physical Therapy Consult: Yes  Occupational Therapy Consult: Yes  Speech Therapy Consult: Yes  Current Support Network: Own Home, Other(Pt and son live together)  Confirm Follow Up Transport: Family  The Plan for Transition of Care is Related to the Following Treatment Goals : home  The Patient and/or Patient Representative was Provided with a Choice of Provider and Agrees with the Discharge Plan?: (N/A)  Name of the Patient Representative Who was Provided with a Choice of Provider and Agrees with the Discharge Plan: (N.A)  Freedom of Choice List was Provided with Basic Dialogue that Supports the Patient's Individualized Plan of Care/Goals, Treatment Preferences and Shares the Quality Data Associated with the Providers?: (N/A)  [place] Resource Information Provided?: No  Discharge Location  Discharge Placement: Home      This CM met with pt at bedside this day to complete assessment. She verified that she goes to [facility] for primary care, verified she does not have insurance, confirmed her emergency contact and home address. She reports she gets her medications from [facility]. She lives at home with her son with 4 steps to enter. She has no home DME. She confirms that at baseline prior to admission she is independent with her ADLs including bathing, dressing, cooking, and driving. She confirms that representative has met with her to review financial assistance application. We discussed discharge planning this day and at this time she plans on returning home with son when stable for discharge. PT, OT, and SLP eval's ordered and will await their recommendation for post discharge needs. CM to continue to follow.

## 2020-02-05 NOTE — CONSULTS
Consult    Patient: Lashay Foss MRN: [id number]     YOB: 1970  Age: 44 y.o. Sex: female      Subjective:      Lashay Foss is a 44 y.o. female who is being seen for dizziness. The patient went to ED yesterday after an allergic reaction to hair dye. She was d/c on prednisone and benadryl and returned yesterday after dizziness worsened. She reports that she also felt hot, sweaty, and off balance. Symptoms are better today, but not resolved. MRI of brain was negative for acute changes. Past Medical History:   Diagnosis Date    CVA (cerebral vascular accident) ([facility] [address] 2/5/2020    Gastrointestinal disorder     ulcers    Hypertension      Past Surgical History:   Procedure Laterality Date    HX CHOLECYSTECTOMY      HX GYN      tubal    HX ORTHOPAEDIC      tubal ligation       No family history on file.   Social History     Tobacco Use    Smoking status: Current Every Day Smoker     Packs/day: 0.25   Substance Use Topics    Alcohol use: Yes     Comment: social      Current Facility-Administered Medications   Medication Dose Route Frequency Provider Last Rate Last Dose    HYDROcodone-acetaminophen (NORCO) 5-325 mg per tablet 1 Tab  1 Tab Oral Q4H PRN Niya Gonzales MD        predniSONE (DELTASONE) tablet 50 mg  50 mg Oral DAILY Niya Gonzales MD        sodium chloride (NS) flush 5-40 mL  5-40 mL IntraVENous Q8H Niya Gonzales MD   10 mL at 02/05/20 1400    sodium chloride (NS) flush 5-40 mL  5-40 mL IntraVENous PRN Niya Gonzales MD       [address] [place] [address] ON 2/6/2020] aspirin chewable tablet 81 mg  81 mg Oral DAILY Niya Gonzales MD        atorvastatin (LIPITOR) tablet 40 mg  40 mg Oral QHS Niya Gonzales MD        acetaminophen (TYLENOL) tablet 650 mg  650 mg Oral Q4H PRN Niya Gonzales MD        labetaloL (NORMODYNE;TRANDATE) injection 5 mg  5 mg IntraVENous Q10MIN PRN Niya Gonzales MD        enoxaparin (LOVENOX) injection 40 mg  40 mg SubCUTAneous Q24H Niya Gonzales MD   40 mg at 02/05/20 0606    sodium chloride 0.9 % bolus infusion 100 mL  100 mL IntraVENous RAD ONCE Abdiaziz Greene MD        saline peripheral flush soln 10 mL  10 mL InterCATHeter RAD Anibal Mccloud MD            No Known Allergies    Review of Systems:  CONSTITUTIONAL:  Denies weight loss, fever, chills, weakness or fatigue. HEENT:  Eyes:  Endorses blurred vision. Denies visual loss or yellow sclerae. Ears, Nose, Throat:  Denies hearing loss, tinnitus, sneezing, congestion, runny nose or sore throat. SKIN:  Denies rash or itching. CARDIOVASCULAR:  Denies chest pain, chest pressure or chest discomfort. No palpitations or edema. RESPIRATORY:  Denies shortness of breath, cough or sputum. GASTROINTESTINAL:  Denies anorexia, nausea, vomiting or diarrhea. No abdominal pain or blood. GENITOURINARY:  Denies burning with urination. NEUROLOGICAL:  Endorses dizziness and disequilibrium. Denies headache, syncope, paralysis, ataxia, numbness or tingling in the extremities. Denies change in bowel or bladder control. MUSCULOSKELETAL:  Denies muscle, back pain, joint pain or stiffness. HEMATOLOGIC:  Denies anemia, bleeding or bruising. LYMPHATICS:  Denies enlarged nodes. PSYCHIATRIC: Denies history depression or anxiety. ENDOCRINOLOGIC:  Denies reports of sweating, cold or heat intolerance. No polyuria or polydipsia. ALLERGIES:  Denies history of asthma, hives, eczema or rhinitis. Objective:     Vitals:    02/05/20 1200 02/05/20 1229 02/05/20 1330 02/05/20 1516   BP:  (!) 160/102 (!) 170/93 (!) 179/99   Pulse: 72 86  75   Resp: 18   18   Temp:    97.7 °F (36.5 °C)   SpO2:    100%   Weight:       Height:            Physical Exam:  General - Well developed, well nourished, in no apparent distress. Pleasant and conversent. HEENT - Normocephalic, atraumatic. Conjunctiva, tympanic membranes, and oropharynx are clear. Neck - Supple without masses, no bruits   Cardiovascular - Regular rate and rhythm.  Normal S1, S2 without murmurs, rubs, or gallops. Lungs - Clear to auscultation. Abdomen - Soft, nontender with normal bowel sounds. Extremities - Peripheral pulses intact. No edema and no rashes. Neurological examination - Comprehension, attention, memory and reasoning are intact. Language and speech are normal.   On cranial nerve examination, (II, III, IV, VI) pupils are equal, round, and reactive to light. Visual acuity is adequate. Visual fields are full to finger confrontation. Disconjugate gaze and horizontal nystagmus when looking to the left. (V, VII) Face is symmetric and sensation is intact to light touch. (VIII) Hearing is intact. (IX, X) Palate and uvula elevate symmetrically. Voice is normal. (XI) Shoulder shrug is strong and equal bilaterally. (XII)Tongue is midline. Motor examination - There is normal muscle tone and bulk. Power is full throughout. Muscle stretch reflexes are normoactive and there are no pathological reflexes present. Plantar response is flexor. Sensation is intact to light touch, pinprick, vibration and proprioception in all extremities.  Cerebellar examination is normal.      No results found for: CHOL, CHOLPOCT, CHOLX, CHLST, CHOLV, HDL, HDLPOC, HDLP, LDL, LDLCPOC, LDLC, DLDLP, VLDLC, VLDL, TGLX, TRIGL, TRIGP, TGLPOCT, CHHD, CHHDX     No results found for: HBA1C, HGBE8, GPQ8CGNE, DGF6EOSV       Results for orders placed or performed during the hospital encounter of 02/04/20   EKG, 12 LEAD, INITIAL   Result Value Ref Range    Ventricular Rate 59 BPM    Atrial Rate 59 BPM    P-R Interval 202 ms    QRS Duration 96 ms    Q-T Interval 470 ms    QTC Calculation (Bezet) 465 ms    Calculated P Axis 22 degrees    Calculated R Axis 46 degrees    Calculated T Axis 133 degrees    Diagnosis       Sinus bradycardia  Voltage criteria for left ventricular hypertrophy  T wave abnormality, consider lateral ischemia  Prolonged QT  Abnormal ECG  When compared with ECG of 17-AUG-2015 12:16,  Nonspecific T wave abnormality now evident in Inferior leads  T wave inversion now evident in Lateral leads  Confirmed by EMETERIO CONKLIN (), Jairo Feliz ([id number]) on 2/5/2020 12:28:06 PM          MRI Results (most recent):   Results from [place] encounter on 02/04/20   MRI BRAIN WO CONT    Narrative MRI BRAIN WITHOUT CONTRAST 2/5/2020    HISTORY: Dizziness. Stroke like symptoms. Dizziness after allergic reaction to  hair dye. TECHNIQUE: Sagittal and axial T1-weighted, axial T2-weighted, axial and coronal  FLAIR, axial T2-weighted gradient-echo, axial diffusion weighted images with ADC  maps of the brain. COMPARISON: Head CT same day    FINDINGS: There is no acute infarction, acute intracranial hemorrhage,  intra-axial mass, hydrocephalus or extra-axial hematoma. On the T2-weighted and FLAIR sequences, there are a few scattered white matter  hyperintensities. Findings are nonspecific and can be seen with chronic small  vessel ischemic disease, demyelinating disease or with migraine headaches. A few small soft tissue nodules are present in the superficial lobe of the  parotid gland. These may be lymph nodes. Correlate with physical  evaluation. Impression IMPRESSION:    1. No acute infarction. 2. Scattered nonspecific white matter hyperintensities present as described. Most recent CTA:  Results from [place] encounter on 02/04/20   CTA HEAD NECK W WO CONT    Narrative HISTORY: 44years of age Female with dizziness. EXAM: CTA HEAD NECK W WO CONT. Radiation reduction dose techniques were used  for the study. Our CT scanner use one or all of the following- Automated  exposure control, adjustment of the mA and/or KV according the patient size,  iterative reconstruction. 3-D multiplanar reformations were performed at the  workstation. All carotid artery stenosis percentages are based upon NASCET  criteria if appropriate. COMPARISON: Noncontrast CT head exam on 2/5/2020.  MR brain exam on 2/5/2020. Both examinations were without evidence of acute intracranial abnormality. FINDINGS:   VASCULAR FINDINGS:  Cervical CTA:  There is a bovine aortic arch. The right subclavian artery is patent where  visualized. The left subclavian artery is patent where visualized. The right common carotid artery is patent. The right external carotid artery is  patent. The right carotid bulb and ICA origin are patent without stenosis. The cervical  right ICA is patent. The vertebral arteries are codominant. The cervical right vertebral artery is  patent. The left common carotid artery is patent. The left external carotid artery is  patent. The left carotid bulb and ICA origin are patent without stenosis. The  cervical left ICA is patent. The cervical left vertebral artery is patent. No significant stenosis or occlusion in the major cervical arterial vasculature. Cranial CTA:  The bilateral intracranial internal carotid arteries are patent. The left A1 segment is patent. The right A1 segment is patent. The bilateral A2  and A3 segments are patent. The right M1 is patent. The major proximal right MCA branches beyond the  bifurcation are patent. The left M1 is patent. The major proximal left MCA  branches beyond the bifurcation are patent. The intracranial vertebral arteries are patent. The basilar artery is patent. The right posterior cerebral artery is patent with a fetal origin. The left  posterior cerebral artery is patent. Hypoplastic bilateral P1 segments. No evidence of intracranial aneurysms. The major dural venous sinuses are poorly  evaluated in this examination for the major intracranial arterial vasculature  but appear grossly patent. NONVASCULAR FINDINGS:  Head:  There is unremarkable brain parenchymal pattern. No evidence of enhancing masses  or focal fluid collections. The orbital structures are unremarkable.  The  calvarial and maxillofacial soft tissues are without evidence of acute  abnormality. Neck:  The thyroid gland is unremarkable. No evidence of significant cervical or upper  thoracic adenopathy. There are several bilateral periarticular and cervical  lymph nodes which are not enlarged by size criteria bilaterally. The visualized  lungs are without areas of focal consolidation. OSSEOUS STRUCTURES:  The mastoid air cells are well aerated. The paranasal sinuses are without  significant mucoperiosteal thickening. There are degenerative changes of the mid  to lower cervical spine. 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 Impression IMPRESSION:  1. Negative CTA exam of the major cervical arterial vasculature for significant  stenosis or occlusion. 2. Negative CTA exam of the major intracranial arterial vasculature for  significant stenosis, occlusion, or aneurysms. Most recent MRA:  Results from [place] encounter on 02/04/20   MRI BRAIN WO CONT    Narrative MRI BRAIN WITHOUT CONTRAST 2/5/2020    HISTORY: Dizziness. Stroke like symptoms. Dizziness after allergic reaction to  hair dye. TECHNIQUE: Sagittal and axial T1-weighted, axial T2-weighted, axial and coronal  FLAIR, axial T2-weighted gradient-echo, axial diffusion weighted images with ADC  maps of the brain. COMPARISON: Head CT same day    FINDINGS: There is no acute infarction, acute intracranial hemorrhage,  intra-axial mass, hydrocephalus or extra-axial hematoma. On the T2-weighted and FLAIR sequences, there are a few scattered white matter  hyperintensities. Findings are nonspecific and can be seen with chronic small  vessel ischemic disease, demyelinating disease or with migraine headaches. A few small soft tissue nodules are present in the superficial lobe of the  parotid gland. These may be lymph nodes. Correlate with physical  evaluation. Impression IMPRESSION:    1. No acute infarction. 2. Scattered nonspecific white matter hyperintensities present as described. Most recent Echo:  Results for orders placed or performed during the hospital encounter of 20   2D ECHO COMPLETE ADULT (TTE) W OR [address] [facility]  [address], [address] [facility]  [phone]    Transthoracic Echocardiogram  2D, M-mode, Doppler, and Color Doppler    Patient: Megha Wilks  MR #: [id number]  : 1970  Age: 44 years  Gender: Female  Study date: 2020  Account #: [de-identified]  Height: 69 in  Weight: 231.4 lb  BSA: 2.2 mï¾²  Status:Routine  Location: 220  BP: 170/ 86    Allergies: NO KNOWN ALLERGENS    Sonographer:  Shirley Leavitt RDCS  Group:  [facility]  Referring Physician:  Heather Araujo MD  Reading Physician:  MINI Harper MD [facility]    INDICATIONS: Possible CVA. PROCEDURE: This was a routine study. A transthoracic echocardiogram was  performed. The study included complete 2D imaging, M-mode, complete spectral  Doppler, and color Doppler. Intravenous contrast (agitated saline) was  administered. Image quality was adequate. LEFT VENTRICLE: Size was normal. Systolic function was normal. Ejection  fraction was estimated in the range of 55 % to 60 %. There were no regional  wall motion abnormalities. There was moderate concentric hypertrophy. The   study  was not technically sufficient to allow evaluation of LV diastolic function. RIGHT VENTRICLE: The ventricle was mildly dilated. Systolic function was  normal. There was no pulmonary artery hypertension. Estimated peak pressure   was  in the range of 15-20 mmHg. LEFT ATRIUM: The atrium was moderately dilated. RIGHT ATRIUM: Size was normal.    SYSTEMIC VEINS: IVC: The inferior vena cava was normal in size and course. AORTIC VALVE: The valve was trileaflet. There was no evidence for stenosis.   There was mild to moderate regurgitation. MITRAL VALVE: Valve structure was normal. There was no evidence for stenosis. There was trivial regurgitation. TRICUSPID VALVE: The valve structure was normal. There was no evidence for  stenosis. There was trivial regurgitation. PULMONIC VALVE: The valve structure was normal. There was no evidence for  stenosis. There was no insufficiency. PERICARDIUM: There was no pericardial effusion. AORTA: The root exhibited mild dilatation. 4.1cm. SUMMARY:    -  Left ventricle: Systolic function was normal. Ejection fraction was  estimated in the range of 55 % to 60 %. There were no regional wall motion  abnormalities. There was moderate concentric hypertrophy. The study was not  technically sufficient to allow evaluation of LV diastolic function.    -  Right ventricle: The ventricle was mildly dilated. There was no pulmonary  artery hypertension.    -  Left atrium: The atrium was moderately dilated. -  Aortic valve: There was mild to moderate regurgitation.    -  Mitral valve: There was trivial regurgitation.    -  Tricuspid valve: There was trivial regurgitation.    -  Aorta, systemic arteries: The root exhibited mild dilatation. 4.1cm. SYSTEM MEASUREMENT TABLES    2D mode  LA Dimension (2D): 4.9 cm  Left Atrium Systolic Volume Index; Method of Disks, Biplane; 2D mode;: 43   ml/m2  IVS/LVPW (2D): 0.8  IVSd (2D): 1.6 cm  LVIDd (2D): 5.5 cm  LVIDs (2D): 4.1 cm  LVPWd (2D): 1.9 cm  RVIDd (2D): 3.9 cm    Unspecified Scan Mode  Peak Grad; Mean; Antegrade Flow: 16 mm[Hg]  Vmax; Antegrade Flow: 200 cm/s    Prepared and signed by    MINI Prado MD [facility]  Signed 05-Feb-2020 11:44:59             Assessment:     44year old with dizziness and feeling off balance. The patient has horizontal nystagmus on exam and dysconjugate gaze. MRI of brain was negative for stroke.  The patient likely has vertigo of a peripheral cause, however her disconjugate gaze raises the suspicion for MRI negative stroke. If symptoms do not improve, will repeat MRI of brain. Plan:     Continue aspirin    Physical therapy    Symptomatic treatment for vertigo, if needed     No need for permissive hypertension.  Patient needs improved control of blood pressure long-term, with long-term goal <140/90    Signed By: Ezequiel Conrad NP     February 5, 2020

## 2020-02-05 NOTE — ED NOTES
TRANSFER - OUT REPORT:    Verbal report given to Den Ortega RN(name) on Ethelle Cornea  being transferred to 220(unit) for routine progression of care       Report consisted of patients Situation, Background, Assessment and   Recommendations(SBAR). Information from the following report(s) SBAR and ED Summary was reveiwed with the receiving nurse. Opportunity for questions and clarification was provided.       Ischemic Stroke without Activase/TIA    BP Parameters: Less Than 220/120 for 24 hours, then consult MD for parameters    Controlled With: PRN - IV    Dysphagia Screen Completed: Yes: Pass  Dysphagia Screening  Vocal Quality/Secretions: Normal  History of Dysphagia: No  O2 Saturation: Normal  Alertness: Normal  Pre-Swallow Assessment Score: 0  Purees: No difficulty noted  Water by Cup: No difficulty noted  Water by Straw: No difficulty noted     NIH Stroke Scale Complete: Yes: 0    Frequency of Vital Signs: Every 15 minutes for 2 hours    Frequency of Neuro Checks: Every 15 minutes for 2 hours

## 2020-02-05 NOTE — H&P
Hospitalist Note     Admit Date:  2020 10:26 PM   Name:  Robert Valenzuela   Age:  44 y.o.  :  1970   MRN:  [id number]   PCP:  None  Treatment Team: Primary Nurse: Rocio Wilson    HPI/Subjective: The patient is a 68-year-old female with a past medical history of hypertension who presents emergency department complaining of dizziness. Of note patient reported to ED yesterday with symptoms of allergic reaction to hair dye, and was discharged on oral prednisone. Patient reports she felt mildly dizzy after allergic reaction but notices significant increase in symptoms today at 5:30 PM.  Patient reports at 5:30 PM she has felt suddenly hot and sweaty. She reported she felt immediately much more dizzy and off-balance. She reports she feels like she cannot walk. She reports she feels very unsteady on her feet. She reports it feels like the room is spinning. She reports she has been feeling nauseous and has had one episode of emesis. She reports episode of double vision earlier. She denies any blood in emesis. She reports she has been eating. She reports she feels mildly short of breath. She reports increased urinary frequency. Patient denies any chest pain, palpitations, myalgias, arthralgias, weakness, numbness, or tingling. 10 systems reviewed and negative except as noted in HPI. Past Medical History:   Diagnosis Date    CVA (cerebral vascular accident) ([facility] [address] 2020    Gastrointestinal disorder     ulcers    Hypertension       Past Surgical History:   Procedure Laterality Date    HX CHOLECYSTECTOMY      HX GYN      tubal    HX ORTHOPAEDIC      tubal ligation       No Known Allergies   Social History     Tobacco Use    Smoking status: Current Every Day Smoker     Packs/day: 0.25   Substance Use Topics    Alcohol use: Yes     Comment: social      No family history on file.    Immunization History   Administered Date(s) Administered    TDAP Vaccine 08/15/2012     PTA Medications:  Prior to Admission Medications   Prescriptions Last Dose Informant Patient Reported? Taking? HYDROcodone-acetaminophen (NORCO) 5-325 mg per tablet   No No   Sig: Take 1 Tab by mouth every four (4) hours as needed for Pain for up to 3 days. Max Daily Amount: 6 Tabs. cloNIDine (CATAPRESS) 0.2 mg tablet   No No   Sig: Take 1 Tab by mouth two (2) times a day. predniSONE (DELTASONE) 50 mg tablet   No No   Sig: Take 1 Tab by mouth daily for 7 days. Facility-Administered Medications: None       Objective:     Patient Vitals for the past 24 hrs:   Temp Pulse Resp BP SpO2   02/05/20 0144  81 18 195/90 97 %   02/05/20 0141  63 11 (!) 208/90 99 %   02/05/20 0129  68 16 (!) 211/100 99 %   02/05/20 0114  63 14 (!) 201/96 97 %   02/05/20 0102  64 15 (!) 201/98 96 %   02/05/20 0059  62  (!) 192/91    02/04/20 2147 97.5 °F (36.4 °C) 67 16 (!) 178/104 99 %     Oxygen Therapy  O2 Sat (%): 97 % (02/05/20 0144)  Pulse via Oximetry: 81 beats per minute (02/05/20 0144)  O2 Device: Room air (02/04/20 2147)    Estimated body mass index is 33.77 kg/m² as calculated from the following:    Height as of this encounter: 5' 9.5\" (1.765 m). Weight as of this encounter: 105.2 kg (232 lb). No intake or output data in the 24 hours ending 02/05/20 0206    *Note that automatically entered I/Os may not be accurate; dependent on patient compliance with collection and accurate  by assistants.     Physical Exam:  General: Female no acute distress  Eyes: Pupils sluggish to light, vertical and horizontal nystagmus, nonicteric, swelling  ENT: Moist mucous membranes  Cardiovascular: RRR, no significant rubs or murmurs appreciated  Respiratory: No wheezes, rales, or rhonchi; decreased breath sounds bilaterally  Gastrointestinal: Soft, nontender, nondistended, bowel sounds active  Genitourinary: No suprapubic tenderness  Musculoskeletal: No joint swelling appreciated  Skin: No lesions on examined area  Neurological: Alert and oriented, no focal deficits noted, vertical and horizontal nystagmus, strength equal bilaterally of upper and lower extremities  Psychiatric: Normal mood and affect    I reviewed the labs, imaging, EKGs, telemetry, and other studies done this admission. Data Review:   Recent Results (from the past 24 hour(s))   CBC WITH AUTOMATED DIFF    Collection Time: 02/04/20 11:56 PM   Result Value Ref Range    WBC 10.6 4.3 - 11.1 K/uL    RBC 4.65 4.05 - 5.2 M/uL    HGB 13.1 11.7 - 15.4 g/dL    HCT 40.9 35.8 - 46.3 %    MCV 88.0 79.6 - 97.8 FL    MCH 28.2 26.1 - 32.9 PG    MCHC 32.0 31.4 - 35.0 g/dL    RDW 16.4 (H) 11.9 - 14.6 %    PLATELET 550 781 - 163 K/uL    MPV 10.6 9.4 - 12.3 FL    ABSOLUTE NRBC 0.00 0.0 - 0.2 K/uL    DF AUTOMATED      NEUTROPHILS 81 (H) 43 - 78 %    LYMPHOCYTES 15 13 - 44 %    MONOCYTES 3 (L) 4.0 - 12.0 %    EOSINOPHILS 0 (L) 0.5 - 7.8 %    BASOPHILS 0 0.0 - 2.0 %    IMMATURE GRANULOCYTES 1 0.0 - 5.0 %    ABS. NEUTROPHILS 8.6 (H) 1.7 - 8.2 K/UL    ABS. LYMPHOCYTES 1.6 0.5 - 4.6 K/UL    ABS. MONOCYTES 0.3 0.1 - 1.3 K/UL    ABS. EOSINOPHILS 0.0 0.0 - 0.8 K/UL    ABS. BASOPHILS 0.0 0.0 - 0.2 K/UL    ABS. IMM. GRANS. 0.1 0.0 - 0.5 K/UL   METABOLIC PANEL, COMPREHENSIVE    Collection Time: 02/04/20 11:56 PM   Result Value Ref Range    Sodium 139 136 - 145 mmol/L    Potassium 3.3 (L) 3.5 - 5.1 mmol/L    Chloride 103 98 - 107 mmol/L    CO2 28 21 - 32 mmol/L    Anion gap 8 7 - 16 mmol/L    Glucose 141 (H) 65 - 100 mg/dL    BUN 17 6 - 23 MG/DL    Creatinine 0.92 0.6 - 1.0 MG/DL    GFR est AA >60 >60 ml/min/1.73m2    GFR est non-AA >60 >60 ml/min/1.73m2    Calcium 9.4 8.3 - 10.4 MG/DL    Bilirubin, total 0.4 0.2 - 1.1 MG/DL    ALT (SGPT) 33 12 - 65 U/L    AST (SGOT) 21 15 - 37 U/L    Alk.  phosphatase 145 (H) 50 - 136 U/L    Protein, total 8.4 (H) 6.3 - 8.2 g/dL    Albumin 3.6 3.5 - 5.0 g/dL    Globulin 4.8 (H) 2.3 - 3.5 g/dL    A-G Ratio 0.8 (L) 1.2 - 3.5     LIPASE    Collection Time: 02/04/20 11:56 PM   Result Value Ref Range    Lipase 75 73 - 393 U/L       All Micro Results     None          No current facility-administered medications for this encounter. Current Outpatient Medications   Medication Sig    HYDROcodone-acetaminophen (NORCO) 5-325 mg per tablet Take 1 Tab by mouth every four (4) hours as needed for Pain for up to 3 days. Max Daily Amount: 6 Tabs.  predniSONE (DELTASONE) 50 mg tablet Take 1 Tab by mouth daily for 7 days.  cloNIDine (CATAPRESS) 0.2 mg tablet Take 1 Tab by mouth two (2) times a day. Other Studies:  Ct Head Wo Cont    Result Date: 2/5/2020  Noncontrast CT of the brain. COMPARISON: none INDICATION: Dizziness TECHNIQUE: Contiguous axial images were obtained from the skull base through the vertex without IV contrast. Radiation dose reduction techniques were used for this study:  Our CT scanners use one or all of the following: Automated exposure control, adjustment of the mA and/or kVp according to patient's size, iterative reconstruction. FINDINGS: There is no acute intracranial hemorrhage or evidence for acute territorial infarction. There is no mass effect, midline shift or hydrocephalus. There is no extra-axial fluid collection. The cerebellum and brainstem are grossly unremarkable. Included globes appear intact. Paranasal sinuses and the mastoid air cells are aerated. There is no skull fracture. IMPRESSION: 1. No CT evidence of acute intracranial abnormality.       Assessment and Plan:     Hospital Problems as of 2/5/2020 Never Reviewed          Codes Class Noted - Resolved POA    CVA (cerebral vascular accident) [facility]) ICD-10-CM: I63.9  ICD-9-CM: 434.91  2/5/2020 - Present Unknown              Plan:    CVA/TIA  Horizontal and vertical nystagmus present  Patient complains of dizziness and disbalance  CT head: Negative for acute intracranial pathology  Patient presented outside window for TPA  Risk factors: Hypertension and tobacco abuse    Plan:  -Permissive hypertension for 24 hours; labetalol for SBP >220  -MRI head  -CTA head and neck  -Echo with bubble study  -Telemetry monitoring  -Hemoglobin A1c and lipid panel  -Neurology consultation in a.m.  -PT/OT consult    Hypertension  BP elevated  Reports usage of 4 antihypertensive medications at home  Reports taking blood pressure medication this a.m.     Plan:  -Hold home meds for 24 hours    Allergic reaction  Recent allergic reaction to hair dye  On prednisone    Plan:  -Continue prednisone      DVT ppx ordered  Code status:  Full  Estimated LOS:  Greater than 2 midnights  Risk:  high    Signed:  Karuna Baugh MD

## 2020-02-05 NOTE — ED NOTES
Patient assisted to bathroom x 2 assist. Patient dizzy and unable to stand by herself. Urine sample collected.

## 2020-02-05 NOTE — PROGRESS NOTES
Observation Status  SPEECH LANGUAGE PATHOLOGY: DYSPHAGIA- Initial Assessment and Discharge    NAME/AGE/GENDER: Ana Atkins is a 44 y.o. female  DATE: 2/5/2020  PRIMARY DIAGNOSIS: CVA (cerebral vascular accident) ([facility] [I63.9]      ICD-10: Treatment Diagnosis: R13.12 Dysphagia, Oropharyngeal Phase    INTERDISCIPLINARY COLLABORATION: Registered Nurse  PRECAUTIONS/ALLERGIES: Patient has no known allergies. SUBJECTIVE   Patient alert upright in bed upon arrival. Patient denies any difficulty swallowing or changes in cognitive linguistic function. Speech hoarse. Patient reports hoarse vocal quality started when she had the flu 2 weeks ago. Says voice is improving. History of Present Injury/Illness: Ms. Reshma Shields  has a past medical history of CVA (cerebral vascular accident) ([facility] [address] (2/5/2020), Gastrointestinal disorder, and Hypertension. She also has no past medical history of CAD (coronary artery disease), Cancer ([facility] [address] Chronic kidney disease, or Diabetes ([facility] [address] . She also  has a past surgical history that includes hx orthopaedic; hx cholecystectomy; and hx gyn. Problem List:  (Impairments causing functional limitations):  1. Oropharyngeal dysphagia- No symptoms identified  2. Previous Dysphagia: NONE REPORTED    Diet Prior to Evaluation: Regular/thin       Orientation:   Person  Place  Time  Situation    Pain: Pain Scale 1: Numeric (0 - 10)  Pain Intensity 1: 0       Cognitive-Linguistic Screen:   Speech Production:   o Impaired   Expressive Language:  o WNL   Receptive Language:  o WNL   Cognition:   o WNL       OBJECTIVE   Oral Motor:   · Labial: No impairment  · Dentition: Intact and Natural  · Oral Hygiene: Adequate  · Lingual: No impairment     Swallow assessment:   Patient presented with thin by cup/straw/serial sips, mixed, and solid consistency trials. No overt s/sx airway compromise with solids/liquids. Oral prep time and oral clearance WNL with all trials.  Laryngeal elevation present upon palpation. ASSESSMENT   Patient presents with normal oropharyngeal swallow function. No oropharyngeal dysphagia symptoms identified at this time. Recommend continue prescribed diet: regular consistency/thin liquids. Medications with liquid wash. MRI negative. Patient denies any changes in cognitive linguistic function. No further speech therapy clinically indicated. Tool Used: Dysphagia Outcome and Severity Scale (FRANCIS)    Score Comments   Normal Diet  [x] 7 With no strategies or extra time needed   Functional Swallow  [] 6 May have mild oral or pharyngeal delay   Mild Dysphagia  [] 5 Which may require one diet consistency restricted    Mild-Moderate Dysphagia  [] 4 With 1-2 diet consistencies restricted   Moderate Dysphagia  [] 3 With 2 or more diet consistencies restricted   Moderate-Severe Dysphagia  [] 2 With partial PO strategies (trials with ST only)   Severe Dysphagia  [] 1 With inability to tolerate any PO safely      Score:  Initial: 7 Most Recent:  (Date 02/05/20 )   Interpretation of Tool: The Dysphagia Outcome and Severity Scale (FRANCIS) is a simple, easy-to-use, 7-point scale developed to systematically rate the functional severity of dysphagia based on objective assessment and make recommendations for diet level, independence level, and type of nutrition. Current Medications:   No current facility-administered medications on file prior to encounter. Current Outpatient Medications on File Prior to Encounter   Medication Sig Dispense Refill    hydrALAZINE (APRESOLINE) 50 mg tablet Take 75 mg by mouth three (3) times daily. Indications: patient ta      atorvastatin (LIPITOR) 80 mg tablet Take 80 mg by mouth nightly.  carvediloL (COREG) 25 mg tablet Take 25 mg by mouth two (2) times daily (with meals).  chlorthalidone (HYGROTEN) 25 mg tablet Take 25 mg by mouth daily.  amLODIPine (NORVASC) 10 mg tablet Take  by mouth daily.       losartan (COZAAR) 100 mg tablet Take 100 mg by mouth daily.  HYDROcodone-acetaminophen (NORCO) 5-325 mg per tablet Take 1 Tab by mouth every four (4) hours as needed for Pain for up to 3 days. Max Daily Amount: 6 Tabs. 12 Tab 0    predniSONE (DELTASONE) 50 mg tablet Take 1 Tab by mouth daily for 7 days. 7 Tab 0    cloNIDine (CATAPRESS) 0.2 mg tablet Take 1 Tab by mouth two (2) times a day. 40 Tab 0         PLAN    FREQUENCY/DURATION: No further speech therapy indicated at this time as oropharyngeal swallow function is within normal limits. - Recommendations for next treatment session: No additional speech therapy indicated at this time. REHABILITATION POTENTIAL FOR STATED GOALS: Excellent     COMPLIANCE WITH PROGRAM/EXERCISES: Compliant all of the time    CONTINUATION OF SKILLED SERVICES/MEDICAL NECESSITY:   No dysphagia goals identified as swallow function is within normal limits. RECOMMENDATIONS   DIET:    continue prescribed diet   PO:  Regular   Liquids:  regular thin    MEDICATIONS: With liquid     ASPIRATION PRECAUTIONS  · Slow rate of intake  · Small bites/sips  · Upright at 90 degrees during meal     COMPENSATORY STRATEGIES/MODIFICATIONS  · None     EDUCATION:  · Recommendations discussed with Patient     RECOMMENDATIONS for CONTINUED SPEECH THERAPY:   No further speech therapy indicated at this time.        SAFETY:  After treatment position/precautions:  · Supine in bed  · Call light within reach    Total Treatment Duration:   Time In: 1252  Time Out: [address] [facility], [address] [facility]

## 2020-02-05 NOTE — ED PROVIDER NOTES
Patient was treated here for allergic reaction to hair dye 2 days ago. Did well and discharged. Taking steroids and Benadryl at home. This evening developed some dizziness with nausea worse when she moves her head and opens her eyes. Denies any headache. No chest pain or shortness of breath. No abdominal pain. No previous similar episodes. The history is provided by the patient. No  was used. Dizziness   This is a new problem. The current episode started 3 to 5 hours ago. The problem has not changed since onset. There was no focality noted. Pertinent negatives include no focal weakness, no loss of sensation, no slurred speech, no speech difficulty, no memory loss, no movement disorder, no auditory change, no mental status change, no unresponsiveness and no disorientation. There has been no fever. Associated symptoms include vomiting and nausea. Pertinent negatives include no shortness of breath, no chest pain, no altered mental status, no confusion, no headaches, no bowel incontinence and no bladder incontinence. Past Medical History:   Diagnosis Date    Gastrointestinal disorder     ulcers    Hypertension        Past Surgical History:   Procedure Laterality Date    HX CHOLECYSTECTOMY      HX GYN      tubal    HX ORTHOPAEDIC      tubal ligation          No family history on file.     Social History     Socioeconomic History    Marital status: SINGLE     Spouse name: Not on file    Number of children: Not on file    Years of education: Not on file    Highest education level: Not on file   Occupational History    Not on file   Social Needs    Financial resource strain: Not on file    Food insecurity:     Worry: Not on file     Inability: Not on file    Transportation needs:     Medical: Not on file     Non-medical: Not on file   Tobacco Use    Smoking status: Current Every Day Smoker     Packs/day: 0.25   Substance and Sexual Activity    Alcohol use: Yes     Comment: social    Drug use: No    Sexual activity: Not on file   Lifestyle    Physical activity:     Days per week: Not on file     Minutes per session: Not on file    Stress: Not on file   Relationships    Social connections:     Talks on phone: Not on file     Gets together: Not on file     Attends Denominational service: Not on file     Active member of club or organization: Not on file     Attends meetings of clubs or organizations: Not on file     Relationship status: Not on file    Intimate partner violence:     Fear of current or ex partner: Not on file     Emotionally abused: Not on file     Physically abused: Not on file     Forced sexual activity: Not on file   Other Topics Concern    Not on file   Social History Narrative    Not on file         ALLERGIES: Patient has no known allergies. Review of Systems   Constitutional: Negative for chills and fever. HENT: Negative for rhinorrhea and sore throat. Eyes: Positive for visual disturbance. Negative for pain and redness. Respiratory: Negative for chest tightness, shortness of breath and wheezing. Cardiovascular: Negative for chest pain and leg swelling. Gastrointestinal: Positive for nausea and vomiting. Negative for abdominal pain, bowel incontinence and diarrhea. Genitourinary: Negative for bladder incontinence, dysuria and hematuria. Musculoskeletal: Negative for back pain, gait problem, neck pain and neck stiffness. Skin: Negative for color change and rash. Neurological: Positive for dizziness. Negative for focal weakness, facial asymmetry, speech difficulty, weakness, light-headedness, numbness and headaches. Psychiatric/Behavioral: Negative for confusion and memory loss. Vitals:    02/04/20 2147   BP: (!) 178/104   Pulse: 67   Resp: 16   Temp: 97.5 °F (36.4 °C)   SpO2: 99%   Weight: 105.2 kg (232 lb)   Height: 5' 9.5\" (1.765 m)            Physical Exam  Constitutional:       Appearance: Normal appearance. She is well-developed. HENT:      Head: Normocephalic and atraumatic. Eyes:      Extraocular Movements: Extraocular movements intact. Conjunctiva/sclera: Conjunctivae normal.      Pupils: Pupils are equal, round, and reactive to light. Comments: Nystagmus and double vision on exam.    Neck:      Musculoskeletal: Normal range of motion and neck supple. Cardiovascular:      Rate and Rhythm: Normal rate and regular rhythm. Pulmonary:      Effort: Pulmonary effort is normal.      Breath sounds: Normal breath sounds. Abdominal:      General: Bowel sounds are normal.      Palpations: Abdomen is soft. Tenderness: There is no abdominal tenderness. Musculoskeletal: Normal range of motion. Skin:     General: Skin is warm and dry. Neurological:      General: No focal deficit present. Mental Status: She is alert and oriented to person, place, and time. Cranial Nerves: No cranial nerve deficit. Motor: No weakness. MDM  Number of Diagnoses or Management Options  Diagnosis management comments: Patient with vertigo dizziness for the past 2 days. Getting worse. Has nausea and vomiting with it. Also with hypertensive urgency. CT negative. Will admit for possible stroke versus new onset vertigo. Amount and/or Complexity of Data Reviewed  Clinical lab tests: ordered and reviewed  Tests in the radiology section of CPT®: ordered and reviewed  Tests in the medicine section of CPT®: ordered and reviewed    Patient Progress  Patient progress: stable         Procedures        EKG: normal sinus rhythm, nonspecific ST and T waves changes. Rate 59. UA neg.             Results Include:    Recent Results (from the past 24 hour(s))   CBC WITH AUTOMATED DIFF    Collection Time: 02/04/20 11:56 PM   Result Value Ref Range    WBC 10.6 4.3 - 11.1 K/uL    RBC 4.65 4.05 - 5.2 M/uL    HGB 13.1 11.7 - 15.4 g/dL    HCT 40.9 35.8 - 46.3 %    MCV 88.0 79.6 - 97.8 FL    MCH 28.2 26.1 - 32.9 PG    MCHC 32.0 31.4 - 35.0 g/dL    RDW 16.4 (H) 11.9 - 14.6 %    PLATELET 298 270 - 327 K/uL    MPV 10.6 9.4 - 12.3 FL    ABSOLUTE NRBC 0.00 0.0 - 0.2 K/uL    DF AUTOMATED      NEUTROPHILS 81 (H) 43 - 78 %    LYMPHOCYTES 15 13 - 44 %    MONOCYTES 3 (L) 4.0 - 12.0 %    EOSINOPHILS 0 (L) 0.5 - 7.8 %    BASOPHILS 0 0.0 - 2.0 %    IMMATURE GRANULOCYTES 1 0.0 - 5.0 %    ABS. NEUTROPHILS 8.6 (H) 1.7 - 8.2 K/UL    ABS. LYMPHOCYTES 1.6 0.5 - 4.6 K/UL    ABS. MONOCYTES 0.3 0.1 - 1.3 K/UL    ABS. EOSINOPHILS 0.0 0.0 - 0.8 K/UL    ABS. BASOPHILS 0.0 0.0 - 0.2 K/UL    ABS. IMM. GRANS. 0.1 0.0 - 0.5 K/UL   METABOLIC PANEL, COMPREHENSIVE    Collection Time: 02/04/20 11:56 PM   Result Value Ref Range    Sodium 139 136 - 145 mmol/L    Potassium 3.3 (L) 3.5 - 5.1 mmol/L    Chloride 103 98 - 107 mmol/L    CO2 28 21 - 32 mmol/L    Anion gap 8 7 - 16 mmol/L    Glucose 141 (H) 65 - 100 mg/dL    BUN 17 6 - 23 MG/DL    Creatinine 0.92 0.6 - 1.0 MG/DL    GFR est AA >60 >60 ml/min/1.73m2    GFR est non-AA >60 >60 ml/min/1.73m2    Calcium 9.4 8.3 - 10.4 MG/DL    Bilirubin, total 0.4 0.2 - 1.1 MG/DL    ALT (SGPT) 33 12 - 65 U/L    AST (SGOT) 21 15 - 37 U/L    Alk. phosphatase 145 (H) 50 - 136 U/L    Protein, total 8.4 (H) 6.3 - 8.2 g/dL    Albumin 3.6 3.5 - 5.0 g/dL    Globulin 4.8 (H) 2.3 - 3.5 g/dL    A-G Ratio 0.8 (L) 1.2 - 3.5     LIPASE    Collection Time: 02/04/20 11:56 PM   Result Value Ref Range    Lipase 75 73 - 393 U/L       CT HEAD WO CONT (Final result)   Result time 02/05/20 01:16:54   Final result by Brady Goldstein MD (02/05/20 01:16:54)                Impression:    IMPRESSION:    1. No CT evidence of acute intracranial abnormality. Narrative:    Noncontrast CT of the brain.      COMPARISON: none    INDICATION: Dizziness    TECHNIQUE: Contiguous axial images were obtained from the skull base through the  vertex without IV contrast. Radiation dose reduction techniques were used for  this study:  Our CT scanners use one or all of the following: Automated exposure  control, adjustment of the mA and/or kVp according to patient's size, iterative  reconstruction. FINDINGS:    There is no acute intracranial hemorrhage or evidence for acute territorial  infarction. There is no mass effect, midline shift or hydrocephalus. There is no  extra-axial fluid collection. The cerebellum and brainstem are grossly  unremarkable. Included globes appear intact. Paranasal sinuses and the mastoid air cells are  aerated.  There is no skull fracture.

## 2020-02-05 NOTE — PROGRESS NOTES
SPEECH PATHOLOGY NOTE:    Speech therapy consult received and appreciated. Attempted to see patient this AM for bedside swallow evaluation. Echo currently in progress. Will re-attempt at later time this date as patient becomes available.          Linnea Mitchell MS, CCC-SLP

## 2020-02-05 NOTE — ED TRIAGE NOTES
Pt arrives from home via POV with c/o \"extreme dizziness\" since 2/2. Pt was laying in the back of the vehicle and had to be brought out via stretcher and long board. Pt states she cannot stand, cannot walk. Pt was seen at this facility on 2/2 with contact dermatitis d/t hair dye. Pt denies hx of vertigo. Pt states this dizziness has been a problem since Sunday. It had subsided a slight extent, but has re-emerged with inability to stand and nausea. Pt denies light sensitivity, denies vomiting, denies numbness in extremities. Pt has hx of HTN, states compliant with medication, BP in triage 178/104.

## 2020-02-05 NOTE — PROGRESS NOTES
02/05/20 0440   Dual Skin Pressure Injury Assessment   Dual Skin Pressure Injury Assessment WDL   Second Care Provider (Based on [address] [place] [address]) Martha Ferraro RN   Skin Integumentary   Skin Integumentary (WDL) X    Pressure  Injury Documentation No Pressure Injury Noted-Pressure Ulcer Prevention Initiated   Skin Color Appropriate for ethnicity   Skin Condition/Temp Dry;Warm;Flaky   Skin Integrity Scars (comment); Tattoos (comment); Other (comment); Excoriation  (BLE's, anterior shin discoloring: Eczema)   Turgor Non-tenting   Wound Prevention and Protection Methods   Orientation of Wound Prevention Posterior   Location of Wound Prevention Sacrum/Coccyx   Dressing Present  No   Wound Offloading (Prevention Methods) Bed, pressure reduction mattress     Patient does not display any pressure injuries at this time. Patient oriented to room and call light. All needs met at this time.

## 2020-02-05 NOTE — PROGRESS NOTES
TRANSFER - IN REPORT:    Verbal report received from TORI Fernandes (name) on Ana Alfredoing  being received from ED(unit) for routine progression of care      Report consisted of patients Situation, Background, Assessment and   Recommendations(SBAR). Information from the following report(s) SBAR, Kardex, ED Summary, Intake/Output and MAR was reviewed with the receiving nurse. Opportunity for questions and clarification was provided. Assessment to be completed upon patients arrival to unit and care assumed.

## 2020-02-06 ENCOUNTER — APPOINTMENT (OUTPATIENT)
Dept: GENERAL RADIOLOGY | Age: 50
DRG: 066 | End: 2020-02-06
Attending: FAMILY MEDICINE
Payer: SELF-PAY

## 2020-02-06 PROBLEM — H55.00 NYSTAGMUS: Status: ACTIVE | Noted: 2020-02-06

## 2020-02-06 LAB
ANION GAP SERPL CALC-SCNC: 5 MMOL/L (ref 7–16)
BUN SERPL-MCNC: 17 MG/DL (ref 6–23)
CALCIUM SERPL-MCNC: 8.6 MG/DL (ref 8.3–10.4)
CHLORIDE SERPL-SCNC: 103 MMOL/L (ref 98–107)
CHOLEST SERPL-MCNC: 121 MG/DL
CO2 SERPL-SCNC: 28 MMOL/L (ref 21–32)
CREAT SERPL-MCNC: 0.86 MG/DL (ref 0.6–1)
ERYTHROCYTE [DISTWIDTH] IN BLOOD BY AUTOMATED COUNT: 16.7 % (ref 11.9–14.6)
EST. AVERAGE GLUCOSE BLD GHB EST-MCNC: 143 MG/DL
FLUAV AG NPH QL IA: NEGATIVE
FLUBV AG NPH QL IA: NEGATIVE
GLUCOSE BLD STRIP.AUTO-MCNC: 118 MG/DL (ref 65–100)
GLUCOSE BLD STRIP.AUTO-MCNC: 141 MG/DL (ref 65–100)
GLUCOSE BLD STRIP.AUTO-MCNC: 171 MG/DL (ref 65–100)
GLUCOSE SERPL-MCNC: 108 MG/DL (ref 65–100)
HBA1C MFR BLD: 6.6 % (ref 4.8–6)
HCT VFR BLD AUTO: 41.8 % (ref 35.8–46.3)
HDLC SERPL-MCNC: 43 MG/DL (ref 40–60)
HDLC SERPL: 2.8 {RATIO}
HGB BLD-MCNC: 13.7 G/DL (ref 11.7–15.4)
LDLC SERPL CALC-MCNC: 53.4 MG/DL
LIPID PROFILE,FLP: ABNORMAL
MCH RBC QN AUTO: 28.5 PG (ref 26.1–32.9)
MCHC RBC AUTO-ENTMCNC: 32.8 G/DL (ref 31.4–35)
MCV RBC AUTO: 87.1 FL (ref 79.6–97.8)
NRBC # BLD: 0 K/UL (ref 0–0.2)
PLATELET # BLD AUTO: 241 K/UL (ref 150–450)
PMV BLD AUTO: 10.9 FL (ref 9.4–12.3)
POTASSIUM SERPL-SCNC: 3.1 MMOL/L (ref 3.5–5.1)
RBC # BLD AUTO: 4.8 M/UL (ref 4.05–5.2)
SODIUM SERPL-SCNC: 136 MMOL/L (ref 136–145)
SPECIMEN SOURCE: NORMAL
TRIGL SERPL-MCNC: 123 MG/DL (ref 35–150)
VLDLC SERPL CALC-MCNC: 24.6 MG/DL (ref 6–23)
WBC # BLD AUTO: 13.3 K/UL (ref 4.3–11.1)

## 2020-02-06 PROCEDURE — 71045 X-RAY EXAM CHEST 1 VIEW: CPT

## 2020-02-06 PROCEDURE — 74011250637 HC RX REV CODE- 250/637: Performed by: INTERNAL MEDICINE

## 2020-02-06 PROCEDURE — 74011250636 HC RX REV CODE- 250/636: Performed by: FAMILY MEDICINE

## 2020-02-06 PROCEDURE — 87804 INFLUENZA ASSAY W/OPTIC: CPT

## 2020-02-06 PROCEDURE — 74011250637 HC RX REV CODE- 250/637: Performed by: FAMILY MEDICINE

## 2020-02-06 PROCEDURE — 80061 LIPID PANEL: CPT

## 2020-02-06 PROCEDURE — 97112 NEUROMUSCULAR REEDUCATION: CPT

## 2020-02-06 PROCEDURE — 82962 GLUCOSE BLOOD TEST: CPT

## 2020-02-06 PROCEDURE — 99218 HC RM OBSERVATION: CPT

## 2020-02-06 PROCEDURE — 85027 COMPLETE CBC AUTOMATED: CPT

## 2020-02-06 PROCEDURE — 74011250636 HC RX REV CODE- 250/636: Performed by: INTERNAL MEDICINE

## 2020-02-06 PROCEDURE — 80048 BASIC METABOLIC PNL TOTAL CA: CPT

## 2020-02-06 PROCEDURE — 99232 SBSQ HOSP IP/OBS MODERATE 35: CPT | Performed by: PSYCHIATRY & NEUROLOGY

## 2020-02-06 PROCEDURE — 36415 COLL VENOUS BLD VENIPUNCTURE: CPT

## 2020-02-06 PROCEDURE — 83036 HEMOGLOBIN GLYCOSYLATED A1C: CPT

## 2020-02-06 PROCEDURE — 65660000000 HC RM CCU STEPDOWN

## 2020-02-06 RX ORDER — ONDANSETRON 8 MG/1
8 TABLET, ORALLY DISINTEGRATING ORAL
Status: DISCONTINUED | OUTPATIENT
Start: 2020-02-06 | End: 2020-02-08 | Stop reason: HOSPADM

## 2020-02-06 RX ORDER — MECLIZINE HCL 12.5 MG 12.5 MG/1
12.5 TABLET ORAL
Status: DISCONTINUED | OUTPATIENT
Start: 2020-02-06 | End: 2020-02-08 | Stop reason: HOSPADM

## 2020-02-06 RX ORDER — GUAIFENESIN 600 MG/1
1200 TABLET, EXTENDED RELEASE ORAL EVERY 12 HOURS
Status: DISCONTINUED | OUTPATIENT
Start: 2020-02-06 | End: 2020-02-08 | Stop reason: HOSPADM

## 2020-02-06 RX ADMIN — MECLIZINE 12.5 MG: 12.5 TABLET ORAL at 19:47

## 2020-02-06 RX ADMIN — CHLORTHALIDONE 25 MG: 25 TABLET ORAL at 08:33

## 2020-02-06 RX ADMIN — Medication 10 ML: at 05:34

## 2020-02-06 RX ADMIN — HYDRALAZINE HYDROCHLORIDE 75 MG: 50 TABLET, FILM COATED ORAL at 15:42

## 2020-02-06 RX ADMIN — AMLODIPINE BESYLATE 10 MG: 10 TABLET ORAL at 08:34

## 2020-02-06 RX ADMIN — ONDANSETRON 8 MG: 8 TABLET, ORALLY DISINTEGRATING ORAL at 10:31

## 2020-02-06 RX ADMIN — HYDRALAZINE HYDROCHLORIDE 75 MG: 50 TABLET, FILM COATED ORAL at 22:18

## 2020-02-06 RX ADMIN — CARVEDILOL 25 MG: 25 TABLET, FILM COATED ORAL at 16:21

## 2020-02-06 RX ADMIN — ATORVASTATIN CALCIUM 40 MG: 40 TABLET, FILM COATED ORAL at 22:18

## 2020-02-06 RX ADMIN — LOSARTAN POTASSIUM 100 MG: 50 TABLET, FILM COATED ORAL at 08:33

## 2020-02-06 RX ADMIN — Medication 10 ML: at 14:00

## 2020-02-06 RX ADMIN — ENOXAPARIN SODIUM 40 MG: 40 INJECTION SUBCUTANEOUS at 04:17

## 2020-02-06 RX ADMIN — ASPIRIN 81 MG 81 MG: 81 TABLET ORAL at 08:33

## 2020-02-06 RX ADMIN — HYDRALAZINE HYDROCHLORIDE 75 MG: 50 TABLET, FILM COATED ORAL at 08:33

## 2020-02-06 RX ADMIN — CARVEDILOL 25 MG: 25 TABLET, FILM COATED ORAL at 08:33

## 2020-02-06 RX ADMIN — Medication 10 ML: at 22:23

## 2020-02-06 RX ADMIN — GUAIFENESIN 1200 MG: 600 TABLET ORAL at 22:18

## 2020-02-06 RX ADMIN — GUAIFENESIN 1200 MG: 600 TABLET ORAL at 11:31

## 2020-02-06 NOTE — MED STUDENT NOTES
ATTENTION: 
MEDICAL STUDENT NOTE 
NOT FOR CLINICAL USE 
SEE ATTENDING NOTES Medical Student Progress Note SUBJECTIVE:  
Roxanne Aguilera is a 44 y.o. female with a history of HTN who was admitted on 2/4/2020 for dizziness and nausea. She was first seen on 02/02 due to an allergic reaction on her head and face to a new hair dye. She presented on 02/02 with head blisters, swelling, eye swelling, and itchy throat. She was treated with toradol and prednisone and discharged home with prednisone and benadryl. The swelling improved, but on 02/04 she was sitting on her couch at home when she suddenly felt hot, started sweating profusely, had dizziness, and double/blurry vision. Also admitted to nausea, vomiting (no blood), headaches, difficulty walking due to dizziness, urinary frequency, increased thirst, and marijuana use 3-4x/week. Denied CP, SOB, abdominal pain, diarrhea, melena, alcohol intake. 02/06/20: Patient still nauseated and dizzy. She feels like the room is spinning. Also feels like her head is numb. No vomiting, just whitish/yellow sputum. No weakness, aphasia, chest pain, SOB, double vision, abdominal pain. Normal BMs. 
 
 
ROS: negative except for the positives above OBJECTIVE Visit Vitals BP (!) 169/96 Pulse 77 Temp 98.2 °F (36.8 °C) Resp 18 Ht 5' 9.5\" (1.765 m) Wt 105.2 kg (232 lb) SpO2 95% BMI 33.77 kg/m² General: In distress due to dizziness and nausea HEENT: Horizontal nystagmus Lungs:  Wheezing bilaterally Heart:  RRR Abdomen: Soft, non-tender Extremities: No edema Skin  Warm and dry Neurologic:  AAOx3 Pertinent labs, radiology: WBC 13.3 from 10.6 EF 55-60% ASSESSMENT/PLAN Roxanne Aguilera is a 44 y.o. female with a history of HTN who was admitted on 2/4/2020 for dizziness and nausea. Dizziness Neurology following - PT consult for vestibular rehab and epley maneuver Low salt diet Zofran PRN 
 Will repeat MRI after PT if symptoms not improved 
  
Hypertensive emergency Labetalol PRN for SBP >220 or DBP >120 Hyralazine 75 mg TID Lipitor 80 mg daily Coreg 25 mg BID Chlorthalidone 25 mg daily Norvasc 10 mg daily Cozaar 100 mg daily Allergic reaction Improving Hold prednisone and benadryl 
  
Urinary frequency UA Wheezing CXR Signed By: Chinyere Lowe February 6, 2020 ATTENTION: 
MEDICAL STUDENT NOTE 
NOT FOR CLINICAL USE 
SEE ATTENDING NOTES *ATTENTION:  This note has been created by a medical student for educational purposes only. Please do not refer to the content of this note for clinical decision-making, billing, or other purposes. Please see attending physicians note to obtain clinical information on this patient. *

## 2020-02-06 NOTE — PROGRESS NOTES
Hospitalist Progress Note    2020  Admit Date: 2020 10:26 PM   NAME: Masha Byrd   :  1970   MRN:  [id number]   Attending: Yumiko Morel MD  PCP:  None    SUBJECTIVE:   Patient is a 52yo F with a history of HTN who presented to ER with complaint of sudden onset dizziness. Recently treated for scalp allergic reaction to hair dye with predisone and benadryl. : initial stroke workup negative, but nystagmus and vertigo sx persist.  HINTS exam suggestive of central vertigo, MRI with contrast pending. Neuro following. Review of Systems negative with exception of pertinent positives noted above  PHYSICAL EXAM     Visit Vitals  /84   Pulse 81   Temp 97.7 °F (36.5 °C)   Resp 18   Ht 5' 9.5\" (1.765 m)   Wt 105.2 kg (232 lb)   SpO2 97%   BMI 33.77 kg/m²      Temp (24hrs), Av.9 °F (36.6 °C), Min:97.5 °F (36.4 °C), Max:98.3 °F (36.8 °C)    Oxygen Therapy  O2 Sat (%): 97 % (20 1110)  Pulse via Oximetry: 69 beats per minute (20 0429)  O2 Device: Room air (20 1330)    Intake/Output Summary (Last 24 hours) at 2020 1505  Last data filed at 2020 0941  Gross per 24 hour   Intake 120 ml   Output 300 ml   Net -180 ml      General: No acute distress    Lungs:  CTA Bilaterally. Heart:  Regular rate and rhythm,  No murmur, rub, or gallop  Abdomen: Soft, Non distended, Non tender, Positive bowel sounds  Extremities: No cyanosis, clubbing or edema  Neurologic:  Horizontal nystagmus    XR CHEST SNGL V   Final Result   IMPRESSION: No consolidation. CTA HEAD NECK W WO CONT   Final Result   IMPRESSION:   1. Negative CTA exam of the major cervical arterial vasculature for significant   stenosis or occlusion. 2. Negative CTA exam of the major intracranial arterial vasculature for   significant stenosis, occlusion, or aneurysms. MRI BRAIN WO CONT   Final Result   IMPRESSION:      1. No acute infarction.       2. Scattered nonspecific white matter hyperintensities present as described. CT HEAD WO CONT   Final Result   IMPRESSION:      1. No CT evidence of acute intracranial abnormality. MRI BRAIN W WO CONT    (Results Pending)         [address] Problems    Diagnosis Date Noted    Nystagmus 02/06/2020    CVA (cerebral vascular accident) ([facility] [address] 02/05/2020     Plan:    Vertigo: possibly central cause, MRI with contrast pending per neurology. Symptom control zofran/antivert  Appreciate neurology consultation    HTN: severely elevated at times, resume home meds.     Allergic reaction: improved, discontinue prednisone    DVT Prophylaxis: HSQ  Dispo: pending results     Signed By: Fede Handy MD     February 6, 2020

## 2020-02-06 NOTE — PROGRESS NOTES
END OF SHIFT NOTE:    INTAKE/OUTPUT  02/04 0701 - 02/05 0700  In: -   Out: 800 [Urine:800]  Voiding: YES  Catheter: NO  Drain:              Flatus: Patient does have flatus present. Stool:  1 occurrences. Characteristics:       Emesis: 0 occurrences. Characteristics:        VITAL SIGNS  Patient Vitals for the past 12 hrs:   Temp Pulse Resp BP SpO2   02/05/20 1844 98.3 °F (36.8 °C) 81 18 (!) 162/93 97 %   02/05/20 1516 97.7 °F (36.5 °C) 75 18 (!) 179/99 100 %   02/05/20 1330    (!) 170/93    02/05/20 1229  86  (!) 160/102    02/05/20 1200  72 18     02/05/20 1156 97.7 °F (36.5 °C) 72 18 (!) 182/91 98 %   02/05/20 0923  79 18 (!) 180/92 98 %   02/05/20 0919    (!) 191/92    02/05/20 0911 97.4 °F (36.3 °C) 73 18 (!) 219/108 96 %       Pain Assessment  Pain Intensity 1: 0 (02/05/20 1330)        Patient Stated Pain Goal: 0    Ambulating  Yes to bathroom with assist.    Shift report given to oncoming nurse at the bedside.     Roma Rm RN

## 2020-02-06 NOTE — PROGRESS NOTES
Neurology Daily Progress Note     Assessment:     44year old with dizziness and feeling off balance. The patient has bilateral horizontal nystagmus on exam and dysconjugate gaze. MRI of brain was negative for stroke. The patient's symptoms appear more likely to be consistent with vestibular dysfunction, however symptoms could also be of central origin. If symptoms are of central etiology, this could be related to MRI negative stroke, smoking marijuana, or solvent exposure. If symptoms do not improve, will repeat MRI of gilson.        Plan:     Continue aspirin     Physical therapy for vestibular rehab/epley maneuver      Symptomatic treatment for vertigo, if needed      No need for permissive hypertension. Patient needs improved control of blood pressure long-term, with long-term goal <140/90    Subjective: Interval history:    Patient continues to endorse nausea and dizziness. Symptoms remain unchanged. History:    Antonio Gee is a 44 y.o. female who is being seen for dizziness. Review of systems negative with exception of pertinent positives and negatives noted above.        Objective:     Vitals:    02/05/20 2330 02/06/20 0334 02/06/20 0700 02/06/20 0730   BP: 132/75 153/90  (!) 169/96   Pulse: 73 81 (!) 106 77   Resp: 18 18  18   Temp: 97.5 °F (36.4 °C) 98.3 °F (36.8 °C)  98.2 °F (36.8 °C)   SpO2: 99% 97%  95%   Weight:       Height:              Current Facility-Administered Medications:     HYDROcodone-acetaminophen (NORCO) 5-325 mg per tablet 1 Tab, 1 Tab, Oral, Q4H PRN, Cassie Guevara MD    predniSONE (DELTASONE) tablet 50 mg, 50 mg, Oral, DAILY, Ruth Linares MD    sodium chloride (NS) flush 5-40 mL, 5-40 mL, IntraVENous, Q8H, Ruth Sorensen MD, 10 mL at 02/06/20 0534    sodium chloride (NS) flush 5-40 mL, 5-40 mL, IntraVENous, PRN, Cassie Guevara MD    aspirin chewable tablet 81 mg, 81 mg, Oral, DAILY, Ruth Linares MD, 81 mg at 02/06/20 0833    atorvastatin (LIPITOR) tablet 40 mg, 40 mg, Oral, QHS, Coty Toscano MD, 40 mg at 02/05/20 2115    acetaminophen (TYLENOL) tablet 650 mg, 650 mg, Oral, Q4H PRN, Coty Toscano MD    labetaloL (NORMODYNE;TRANDATE) injection 5 mg, 5 mg, IntraVENous, Q10MIN PRN, Coty Toscano MD    enoxaparin (LOVENOX) injection 40 mg, 40 mg, SubCUTAneous, Q24H, Coty Toscano MD, 40 mg at 02/06/20 0417    amLODIPine (NORVASC) tablet 10 mg, 10 mg, Oral, DAILY, Miguelina Nieves MD, 10 mg at 02/06/20 8900    carvediloL (COREG) tablet 25 mg, 25 mg, Oral, BID WITH MEALS, Miguelina Nieves MD, 25 mg at 02/06/20 8153    chlorthalidone (HYGROTEN) tablet 25 mg, 25 mg, Oral, DAILY, Miguelina Nieves MD, 25 mg at 02/06/20 9124    hydrALAZINE (APRESOLINE) tablet 75 mg, 75 mg, Oral, TID, Miguelina Nieves MD, 75 mg at 02/06/20 7655    losartan (COZAAR) tablet 100 mg, 100 mg, Oral, DAILY, Miguelina Nieves MD, 100 mg at 02/06/20 1485    Recent Results (from the past 12 hour(s))   GLUCOSE, POC    Collection Time: 02/05/20  9:32 PM   Result Value Ref Range    Glucose (POC) 125 (H) 65 - 100 mg/dL   LIPID PANEL    Collection Time: 02/06/20  5:31 AM   Result Value Ref Range    LIPID PROFILE          Cholesterol, total 121 <200 MG/DL    Triglyceride 123 35 - 150 MG/DL    HDL Cholesterol 43 40 - 60 MG/DL    LDL, calculated 53.4 <100 MG/DL    VLDL, calculated 24.6 (H) 6.0 - 23.0 MG/DL    CHOL/HDL Ratio 2.8     HEMOGLOBIN A1C WITH EAG    Collection Time: 02/06/20  5:31 AM   Result Value Ref Range    Hemoglobin A1c 6.6 (H) 4.8 - 6.0 %    Est. average glucose 143 mg/dL   CBC W/O DIFF    Collection Time: 02/06/20  5:31 AM   Result Value Ref Range    WBC 13.3 (H) 4.3 - 11.1 K/uL    RBC 4.80 4.05 - 5.2 M/uL    HGB 13.7 11.7 - 15.4 g/dL    HCT 41.8 35.8 - 46.3 %    MCV 87.1 79.6 - 97.8 FL    MCH 28.5 26.1 - 32.9 PG    MCHC 32.8 31.4 - 35.0 g/dL    RDW 16.7 (H) 11.9 - 14.6 %    PLATELET 696 153 - 894 K/uL    MPV 10.9 9.4 - 12.3 FL    ABSOLUTE NRBC 0.00 0.0 - 0.2 K/uL   METABOLIC PANEL, BASIC    Collection Time: 02/06/20  5:31 AM   Result Value Ref Range    Sodium 136 136 - 145 mmol/L    Potassium 3.1 (L) 3.5 - 5.1 mmol/L    Chloride 103 98 - 107 mmol/L    CO2 28 21 - 32 mmol/L    Anion gap 5 (L) 7 - 16 mmol/L    Glucose 108 (H) 65 - 100 mg/dL    BUN 17 6 - 23 MG/DL    Creatinine 0.86 0.6 - 1.0 MG/DL    GFR est AA >60 >60 ml/min/1.73m2    GFR est non-AA >60 >60 ml/min/1.73m2    Calcium 8.6 8.3 - 10.4 MG/DL   GLUCOSE, POC    Collection Time: 02/06/20  8:01 AM   Result Value Ref Range    Glucose (POC) 118 (H) 65 - 100 mg/dL         Physical Exam:  General - Well developed, well nourished, in no apparent distress. Pleasant and conversent. HEENT - Normocephalic, atraumatic. Conjunctiva are clear. Neck - Supple without masses  Cardiovascular - Regular rate and rhythm. Normal S1, S2 without murmurs, rubs, or gallops. Lungs - Clear to auscultation. Abdomen - Soft, nontender with normal bowel sounds. Extremities - Peripheral pulses intact. No edema and no rashes. Neurological examination - Comprehension, attention, memory and reasoning are intact. Language and speech are normal.   On cranial nerve examination, (II, III, IV, VI) pupils are equal, round, and reactive to light. Visual acuity is adequate. Visual fields are full to finger confrontation. Disconjugate gaze and horizontal nystagmus when looking to the left and right. (V, VII) Face is symmetric and sensation is intact to light touch. (VIII) Hearing is intact. (IX, X) Palate and uvula elevate symmetrically. Voice is normal. (XI) Shoulder shrug is strong and equal bilaterally. (XII)Tongue is midline. Motor examination - There is normal muscle tone and bulk. Power is full throughout. Muscle stretch reflexes are normoactive and there are no pathological reflexes present. Plantar response is flexor. Sensation is intact to light touch, pinprick, vibration and proprioception in all extremities.  Cerebellar examination is normal.      Signed By: Brayden Mcfarlane Lizz Rondon NP     February 6, 2020

## 2020-02-06 NOTE — PROGRESS NOTES
Problem: Mobility Impaired (Adult and Pediatric)  Goal: *Acute Goals and Plan of Care (Insert Text)  Description  LTG:  (1.)Ms. Letitia Huertas will move from supine to sit and sit to supine, scoot up and down and roll side to side INDEPENDENTLY with bed flat within 7 treatment day(s). (2.)Ms. Letitia Huertas will transfer from bed to chair and chair to bed INDEPENDENTLY within 7 treatment day(s). (3.)Ms. Fernando will ambulate with SUPERVISION for 250+ feet with the least restrictive device within 7 treatment day(s). (4.)Ms. Fernando will ascend and descend 4 steps with STAND BY ASSIST using handrail(s) within 7 days. ________________________________________________________________________________________________   Outcome: Progressing Towards Goal     PHYSICAL THERAPY: Daily Note and AM 2/6/2020  INPATIENT:    Payor: SELF PAY / Plan: [facility] SELF PAY / Product Type: Self Pay /       NAME/AGE/GENDER: María Elena Parker is a 44 y.o. female   PRIMARY DIAGNOSIS: CVA (cerebral vascular accident) ([facility] [address] [I63.9]  Nystagmus [H55.00] CVA (cerebral vascular accident) ([facility] [address] CVA (cerebral vascular accident) ([facility] [address]       ICD-10: Treatment Diagnosis:    · Generalized Muscle Weakness (M62.81)  · Difficulty in walking, Not elsewhere classified (R26.2)  · Other abnormalities of gait and mobility (R26.89)   Precaution/Allergies:  Patient has no known allergies. ASSESSMENT:     Ms. Letitia Huertas seen this AM & PM for vestibular assessment. Pt. Endorses acute onset of vertigo @ home, accompanied w/ N&V. Since admission, pt. Reports vertigo has improved slightly, but has not resolved. Describes vertigo as constant, exacerbated by position change & improved w/ eyes closed. Pt. Also endorses diplopia, which is new for her. HINTS exam suggestive of central basis for vertigo (see findings below), so [employer] not indicated @ this time. Pt. Also presents w/ saccadic dysmetria R eye w/ hypometric saccades w/ R lateral gaze accompanied by horizontal nystagmus. Eye patch was given to assist w/ diplopia. Recommend the followin. Consider meclizine or scopolamine patch medication to minimize vertiginous symptoms 2. Use eye patch when reading, watching TV or working with therapy 3. Initiate gaze stabilizations exercises once pt. Able to tolerate. Pt. Cont. To mobilize below her baseline & benefits from cont. PT services to address. This section established at most recent assessment   PROBLEM LIST (Impairments causing functional limitations):  1. Decreased Strength  2. Decreased Transfer Abilities  3. Decreased Ambulation Ability/Technique  4. Decreased Balance  5. Decreased Activity Tolerance   INTERVENTIONS PLANNED: (Benefits and precautions of physical therapy have been discussed with the patient.)  1. Balance Exercise  2. Bed Mobility  3. Gait Training  4. Home Exercise Program (HEP)  5. Therapeutic Activites  6. Therapeutic Exercise/Strengthening  7. Transfer Training     TREATMENT PLAN: Frequency/Duration: 3 times a week for duration of hospital stay  Rehabilitation Potential For Stated Goals: Good     REHAB RECOMMENDATIONS (at time of discharge pending progress):    Placement: It is my opinion, based on this patient's performance to date, that Ms. Fabiana Fleming may benefit from intensive therapy at an [address] [place] [address] after discharge due to a probable need for close medical supervision by a rehab physician, a probable need for 24 hour rehab nursing, a probable need for multiple therapy disciplines and potential to make ongoing and sustainable functional improvement that is of practical value. .  Equipment:    Tbd pending progress               HISTORY:   History of Present Injury/Illness (Reason for Referral):  Per H&P, \"The patient is a 33-year-old female with a past medical history of hypertension who presents emergency department complaining of dizziness.   Of note patient reported to ED yesterday with symptoms of allergic reaction to hair dye, and was discharged on oral prednisone. Patient reports she felt mildly dizzy after allergic reaction but notices significant increase in symptoms today at 5:30 PM.  Patient reports at 5:30 PM she has felt suddenly hot and sweaty. She reported she felt immediately much more dizzy and off-balance. She reports she feels like she cannot walk. She reports she feels very unsteady on her feet. She reports it feels like the room is spinning. She reports she has been feeling nauseous and has had one episode of emesis. She reports episode of double vision earlier. She denies any blood in emesis. She reports she has been eating. She reports she feels mildly short of breath. She reports increased urinary frequency. Patient denies any chest pain, palpitations, myalgias, arthralgias, weakness, numbness, or tingling\"    Past Medical History/Comorbidities:   Ms. Reji Roldan  has a past medical history of CVA (cerebral vascular accident) ([facility] [address] (2/5/2020), Gastrointestinal disorder, and Hypertension. She also has no past medical history of CAD (coronary artery disease), Cancer ([facility] [address] Chronic kidney disease, or Diabetes ([facility] [address] Ms. Reji Roldan  has a past surgical history that includes hx orthopaedic; hx cholecystectomy; and hx gyn. Social History/Living Environment:   Home Environment: Private residence  # Steps to Enter: 4  Rails to Enter: Yes  Hand Rails : Bilateral  Wheelchair Ramp: No  One/Two Story Residence: One story  Living Alone: No  Support Systems: Child(prabhjot)  Patient Expects to be Discharged to[de-identified] Private residence  Current DME Used/Available at Home: Walker  Prior Level of Function/Work/Activity:  Lives with son. Fully independent at baseline. Drives, works, denies DME use, no falls.       Number of Personal Factors/Comorbidities that affect the Plan of Care: 1-2: MODERATE COMPLEXITY   EXAMINATION:   Most Recent Physical Functioning:   Gross Assessment:                  Posture:     Balance:    Bed Mobility:  Rolling: Supervision  Wheelchair Mobility:     Transfers:     Gait:            Body Structures Involved:  1. Eyes and Ears Body Functions Affected:  1. Neuromusculoskeletal  2. Movement Related Activities and Participation Affected:  1. General Tasks and Demands  2. Mobility  3. Domestic Life  4. Community, Social and [place]   Number of elements that affect the Plan of Care: 4+: HIGH COMPLEXITY   CLINICAL PRESENTATION:   Presentation: Evolving clinical presentation with changing clinical characteristics: MODERATE COMPLEXITY   CLINICAL DECISION MAKIN [facility] Inpatient Short Form  How much difficulty does the patient currently have. .. Unable A Lot A Little None   1. Turning over in bed (including adjusting bedclothes, sheets and blankets)? [] 1   [] 2   [] 3   [x] 4   2. Sitting down on and standing up from a chair with arms ( e.g., wheelchair, bedside commode, etc.)   [] 1   [] 2   [x] 3   [] 4   3. Moving from lying on back to sitting on the side of the bed? [] 1   [] 2   [] 3   [x] 4   How much help from another person does the patient currently need. .. Total A Lot A Little None   4. Moving to and from a bed to a chair (including a wheelchair)? [] 1   [] 2   [x] 3   [] 4   5. Need to walk in hospital room? [] 1   [x] 2   [] 3   [] 4   6. Climbing 3-5 steps with a railing? [] 1   [x] 2   [] 3   [] 4   © , Trustees of [address] [place] [address], under license to [employer]. All rights reserved      Score:  Initial: 18 Most Recent: X (Date: -- )    Interpretation of Tool:  Represents activities that are increasingly more difficult (i.e. Bed mobility, Transfers, Gait). Medical Necessity:     · Patient demonstrates   · good  ·  rehab potential due to higher previous functional level.   Reason for Services/Other Comments:  · Patient   · continues to demonstrate capacity to improve strength, mobility, balance, transfers, and activity tolerance which will · increase independence, decrease amount of assistance required from caregiver, and increase safety  · . Use of outcome tool(s) and clinical judgement create a POC that gives a: Questionable prediction of patient's progress: MODERATE COMPLEXITY            TREATMENT:   (In addition to Assessment/Re-Assessment sessions the following treatments were rendered)   Pre-treatment Symptoms/Complaints:  Pt. Reports ongoing vertigo, made worse by movement  Pain: Initial:   Pain Intensity 1: 0  Post Session:  0/10     Assessment/Reassessment only, no treatment provided today   Vestibular Assessment:        No nystagmus observed @ rest        Smooth pursuit - R eye w/ direction changing nystagmus w/ horizontal gaze        Test of Skew -  Normal         Head Impulse Test - Normal         Saccades - R eye saccadic dysmetria w/ hypometric vertical & horizontal saccades      Braces/Orthotics/Lines/Etc:   · O2 Device: Room air  Treatment/Session Assessment:    · Response to Treatment:  pt performs bed mobility with supervision, min A sit-stand. Did not ambulate d/t dizziness  · Interdisciplinary Collaboration:   o Physical Therapist  o Registered Nurse  · After treatment position/precautions:   o Supine in bed  o Bed/Chair-wheels locked  o Bed in low position  o Call light within reach  o RN notified   · Compliance with Program/Exercises: Will assess as treatment progresses  · Recommendations/Intent for next treatment session: \"Next visit will focus on advancements to more challenging activities and reduction in assistance provided\".   Total Treatment Duration:  PT Patient Time In/Time Out  Time In: 1040  Time Out: [facility], PT

## 2020-02-07 ENCOUNTER — APPOINTMENT (OUTPATIENT)
Dept: MRI IMAGING | Age: 50
DRG: 066 | End: 2020-02-07
Attending: PSYCHIATRY & NEUROLOGY
Payer: SELF-PAY

## 2020-02-07 LAB
GLUCOSE BLD STRIP.AUTO-MCNC: 108 MG/DL (ref 65–100)
GLUCOSE BLD STRIP.AUTO-MCNC: 108 MG/DL (ref 65–100)
GLUCOSE BLD STRIP.AUTO-MCNC: 113 MG/DL (ref 65–100)
T4 SERPL-MCNC: 9.5 UG/DL (ref 4.8–13.9)
TSH SERPL DL<=0.005 MIU/L-ACNC: 0.69 UIU/ML (ref 0.36–3.74)

## 2020-02-07 PROCEDURE — 82962 GLUCOSE BLOOD TEST: CPT

## 2020-02-07 PROCEDURE — 74011250637 HC RX REV CODE- 250/637: Performed by: FAMILY MEDICINE

## 2020-02-07 PROCEDURE — 74011250637 HC RX REV CODE- 250/637: Performed by: HOSPITALIST

## 2020-02-07 PROCEDURE — 65660000000 HC RM CCU STEPDOWN

## 2020-02-07 PROCEDURE — 36415 COLL VENOUS BLD VENIPUNCTURE: CPT

## 2020-02-07 PROCEDURE — 70553 MRI BRAIN STEM W/O & W/DYE: CPT

## 2020-02-07 PROCEDURE — 84436 ASSAY OF TOTAL THYROXINE: CPT

## 2020-02-07 PROCEDURE — 74011250636 HC RX REV CODE- 250/636: Performed by: FAMILY MEDICINE

## 2020-02-07 PROCEDURE — 99232 SBSQ HOSP IP/OBS MODERATE 35: CPT | Performed by: PSYCHIATRY & NEUROLOGY

## 2020-02-07 PROCEDURE — A9575 INJ GADOTERATE MEGLUMI 0.1ML: HCPCS | Performed by: FAMILY MEDICINE

## 2020-02-07 PROCEDURE — 74011250637 HC RX REV CODE- 250/637: Performed by: INTERNAL MEDICINE

## 2020-02-07 PROCEDURE — 74011250636 HC RX REV CODE- 250/636: Performed by: INTERNAL MEDICINE

## 2020-02-07 PROCEDURE — 97530 THERAPEUTIC ACTIVITIES: CPT

## 2020-02-07 PROCEDURE — 84443 ASSAY THYROID STIM HORMONE: CPT

## 2020-02-07 RX ORDER — GADOTERATE MEGLUMINE 376.9 MG/ML
20 INJECTION INTRAVENOUS
Status: COMPLETED | OUTPATIENT
Start: 2020-02-07 | End: 2020-02-07

## 2020-02-07 RX ORDER — SODIUM CHLORIDE 0.9 % (FLUSH) 0.9 %
10 SYRINGE (ML) INJECTION
Status: COMPLETED | OUTPATIENT
Start: 2020-02-07 | End: 2020-02-07

## 2020-02-07 RX ORDER — CLOPIDOGREL BISULFATE 75 MG/1
75 TABLET ORAL DAILY
Status: DISCONTINUED | OUTPATIENT
Start: 2020-02-08 | End: 2020-02-08 | Stop reason: HOSPADM

## 2020-02-07 RX ADMIN — GUAIFENESIN 1200 MG: 600 TABLET ORAL at 14:12

## 2020-02-07 RX ADMIN — ENOXAPARIN SODIUM 40 MG: 40 INJECTION SUBCUTANEOUS at 04:46

## 2020-02-07 RX ADMIN — ATORVASTATIN CALCIUM 40 MG: 40 TABLET, FILM COATED ORAL at 21:42

## 2020-02-07 RX ADMIN — GUAIFENESIN 1200 MG: 600 TABLET ORAL at 21:41

## 2020-02-07 RX ADMIN — Medication 10 ML: at 14:00

## 2020-02-07 RX ADMIN — HYDRALAZINE HYDROCHLORIDE 75 MG: 50 TABLET, FILM COATED ORAL at 09:52

## 2020-02-07 RX ADMIN — Medication 10 ML: at 06:35

## 2020-02-07 RX ADMIN — HYDRALAZINE HYDROCHLORIDE 75 MG: 50 TABLET, FILM COATED ORAL at 21:41

## 2020-02-07 RX ADMIN — ASPIRIN 81 MG 81 MG: 81 TABLET ORAL at 09:52

## 2020-02-07 RX ADMIN — HYDRALAZINE HYDROCHLORIDE 75 MG: 50 TABLET, FILM COATED ORAL at 17:46

## 2020-02-07 RX ADMIN — MECLIZINE 12.5 MG: 12.5 TABLET ORAL at 09:56

## 2020-02-07 RX ADMIN — GADOTERATE MEGLUMINE 20 ML: 376.9 INJECTION INTRAVENOUS at 16:31

## 2020-02-07 RX ADMIN — Medication 10 ML: at 16:31

## 2020-02-07 RX ADMIN — Medication 5 ML: at 21:43

## 2020-02-07 RX ADMIN — AMLODIPINE BESYLATE 10 MG: 10 TABLET ORAL at 09:52

## 2020-02-07 RX ADMIN — CARVEDILOL 25 MG: 25 TABLET, FILM COATED ORAL at 09:53

## 2020-02-07 RX ADMIN — CARVEDILOL 25 MG: 25 TABLET, FILM COATED ORAL at 17:46

## 2020-02-07 RX ADMIN — CHLORTHALIDONE 25 MG: 25 TABLET ORAL at 09:52

## 2020-02-07 RX ADMIN — Medication 1 SPRAY: at 04:46

## 2020-02-07 RX ADMIN — LOSARTAN POTASSIUM 100 MG: 50 TABLET, FILM COATED ORAL at 09:52

## 2020-02-07 NOTE — PROGRESS NOTES
Problem: Mobility Impaired (Adult and Pediatric)  Goal: *Acute Goals and Plan of Care (Insert Text)  Description  LTG:  (1.)Ms. Theresa Anthony will move from supine to sit and sit to supine, scoot up and down and roll side to side INDEPENDENTLY with bed flat within 7 treatment day(s). (2.)Ms. Theresa Anthony will transfer from bed to chair and chair to bed INDEPENDENTLY within 7 treatment day(s). (3.)Ms. Fernando will ambulate with SUPERVISION for 250+ feet with the least restrictive device within 7 treatment day(s). (4.)Ms. Fernando will ascend and descend 4 steps with STAND BY ASSIST using handrail(s) within 7 days. ________________________________________________________________________________________________   Outcome: Progressing Towards Goal     PHYSICAL THERAPY: Daily Note and PM 2/7/2020  INPATIENT: PT Visit Days : 1  Payor: SELF PAY / Plan: [facility] SELF PAY / Product Type: Self Pay /       NAME/AGE/GENDER: Antonio Gee is a 44 y.o. female   PRIMARY DIAGNOSIS: CVA (cerebral vascular accident) ([facility] [address] [I63.9]  Nystagmus [H55.00] CVA (cerebral vascular accident) ([facility] [address] CVA (cerebral vascular accident) ([facility] [address]       ICD-10: Treatment Diagnosis:    · Generalized Muscle Weakness (M62.81)  · Difficulty in walking, Not elsewhere classified (R26.2)  · Other abnormalities of gait and mobility (R26.89)   Precaution/Allergies:  Patient has no known allergies. ASSESSMENT:     Ms. Theresa Anthony is supine in bed and agreeable to therapy. Patient states that this will be the first time she has been up to walk. Bed mobility is with supervision. Mobility is good but movement is careful. No report of dizziness reported. Standing balance good. Gait training with rolling walker x 90 feet with one sitting rest break. Patient is returned to supine in bed with needs within reach. Patient is wearing eye patch and she states that it does help. Patient has a little unsteadiness but is able to self correct. Good session.   Patient is making progress with goals. Per last visit: Since admission, pt. Reports vertigo has improved slightly, but has not resolved. Describes vertigo as constant, exacerbated by position change & improved w/ eyes closed. Pt. Also endorses diplopia, which is new for her. HINTS exam suggestive of central basis for vertigo (see findings below), so [employer] not indicated @ this time. Pt. Also presents w/ saccadic dysmetria R eye w/ hypometric saccades w/ R lateral gaze accompanied by horizontal nystagmus. Eye patch was given to assist w/ diplopia. Recommend the followin. Consider meclizine or scopolamine patch medication to minimize vertiginous symptoms 2. Use eye patch when reading, watching TV or working with therapy 3. Initiate gaze stabilizations exercises once pt. Able to tolerate. Pt. Cont. To mobilize below her baseline & benefits from cont. PT services to address. Patient is very pleasant and cooperative and a mike to work with. Will continue PT efforts. This section established at most recent assessment   PROBLEM LIST (Impairments causing functional limitations):  1. Decreased Strength  2. Decreased Transfer Abilities  3. Decreased Ambulation Ability/Technique  4. Decreased Balance  5. Decreased Activity Tolerance   INTERVENTIONS PLANNED: (Benefits and precautions of physical therapy have been discussed with the patient.)  1. Balance Exercise  2. Bed Mobility  3. Gait Training  4. Home Exercise Program (HEP)  5. Therapeutic Activites  6. Therapeutic Exercise/Strengthening  7. Transfer Training     TREATMENT PLAN: Frequency/Duration: 3 times a week for duration of hospital stay  Rehabilitation Potential For Stated Goals: Good     REHAB RECOMMENDATIONS (at time of discharge pending progress):    Placement: It is my opinion, based on this patient's performance to date, that Ms. Sj Falk may benefit from intensive therapy at an [address] [place] [address] after discharge due to a probable need for close medical supervision by a rehab physician, a probable need for 24 hour rehab nursing, a probable need for multiple therapy disciplines and potential to make ongoing and sustainable functional improvement that is of practical value. .  Equipment:    Tbd pending progress               HISTORY:   History of Present Injury/Illness (Reason for Referral):  Per H&P, \"The patient is a 64-year-old female with a past medical history of hypertension who presents emergency department complaining of dizziness. Of note patient reported to ED yesterday with symptoms of allergic reaction to hair dye, and was discharged on oral prednisone. Patient reports she felt mildly dizzy after allergic reaction but notices significant increase in symptoms today at 5:30 PM.  Patient reports at 5:30 PM she has felt suddenly hot and sweaty. She reported she felt immediately much more dizzy and off-balance. She reports she feels like she cannot walk. She reports she feels very unsteady on her feet. She reports it feels like the room is spinning. She reports she has been feeling nauseous and has had one episode of emesis. She reports episode of double vision earlier. She denies any blood in emesis. She reports she has been eating. She reports she feels mildly short of breath. She reports increased urinary frequency. Patient denies any chest pain, palpitations, myalgias, arthralgias, weakness, numbness, or tingling\"    Past Medical History/Comorbidities:   Ms. Chika Peck  has a past medical history of CVA (cerebral vascular accident) ([address] (2/5/2020), Gastrointestinal disorder, and Hypertension. She also has no past medical history of CAD (coronary artery disease), Cancer ([address] Chronic kidney disease, or Diabetes ([address] Ms. Chika Peck  has a past surgical history that includes hx orthopaedic; hx cholecystectomy; and hx gyn.   Social History/Living Environment:   Home Environment: Private residence  # Steps to Enter: 4  Rails to Enter: Yes  Hand Rails : Bilateral  Wheelchair Ramp: No  One/Two Story Residence: One story  Living Alone: No  Support Systems: Child(prabhjot)  Patient Expects to be Discharged to[de-identified] Private residence  Current DME Used/Available at Home: Robb Membreno  Prior Level of Function/Work/Activity:  Lives with son. Fully independent at baseline. Drives, works, denies DME use, no falls. Number of Personal Factors/Comorbidities that affect the Plan of Care: 1-2: MODERATE COMPLEXITY   EXAMINATION:   Most Recent Physical Functioning:   Gross Assessment:                  Posture:     Balance:  Sitting: Intact  Standing: Impaired  Standing - Static: Fair  Standing - Dynamic : Fair Bed Mobility:  Rolling: Supervision  Supine to Sit: Supervision  Sit to Supine: Supervision  Scooting: Supervision  Wheelchair Mobility:     Transfers:  Sit to Stand: Contact guard assistance;Minimum assistance  Stand to Sit: Contact guard assistance;Minimum assistance  Gait:            Body Structures Involved:  1. Eyes and Ears Body Functions Affected:  1. Neuromusculoskeletal  2. Movement Related Activities and Participation Affected:  1. General Tasks and Demands  2. Mobility  3. Domestic Life  4. Community, Social and [place]   Number of elements that affect the Plan of Care: 4+: HIGH COMPLEXITY   CLINICAL PRESENTATION:   Presentation: Evolving clinical presentation with changing clinical characteristics: MODERATE COMPLEXITY   CLINICAL DECISION MAKIN [facility] Inpatient Short Form  How much difficulty does the patient currently have. .. Unable A Lot A Little None   1. Turning over in bed (including adjusting bedclothes, sheets and blankets)? [] 1   [] 2   [] 3   [x] 4   2. Sitting down on and standing up from a chair with arms ( e.g., wheelchair, bedside commode, etc.)   [] 1   [] 2   [x] 3   [] 4   3. Moving from lying on back to sitting on the side of the bed?    [] 1   [] 2   [] 3   [x] 4   How much help from another person does the patient currently need. .. Total A Lot A Little None   4. Moving to and from a bed to a chair (including a wheelchair)? [] 1   [] 2   [x] 3   [] 4   5. Need to walk in hospital room? [] 1   [x] 2   [] 3   [] 4   6. Climbing 3-5 steps with a railing? [] 1   [x] 2   [] 3   [] 4   © 2007, Trustees of [address] [place] [address] [address] [address], under license to [employer]. All rights reserved      Score:  Initial: 18 Most Recent: X (Date: -- )    Interpretation of Tool:  Represents activities that are increasingly more difficult (i.e. Bed mobility, Transfers, Gait). Medical Necessity:     · Patient demonstrates   · good  ·  rehab potential due to higher previous functional level. Reason for Services/Other Comments:  · Patient   · continues to demonstrate capacity to improve strength, mobility, balance, transfers, and activity tolerance which will   · increase independence, decrease amount of assistance required from caregiver, and increase safety  · . Use of outcome tool(s) and clinical judgement create a POC that gives a: Questionable prediction of patient's progress: MODERATE COMPLEXITY            TREATMENT:   (In addition to Assessment/Re-Assessment sessions the following treatments were rendered)   Pre-treatment Symptoms/Complaints: \"This will be my first time walking\"  Pain: Initial:   Pain Intensity 1: 0  Post Session:  0/10     Therapeutic Activity: (    24 minutes): Therapeutic activities including bed mobility,balance, transfers and gait to improve mobility, strength, balance and coordination. Required contact guard assist to stand by assist    to increase safety and independence with all functional mobility activities.        Vestibular Assessment: Per DPT        No nystagmus observed @ rest        Smooth pursuit - R eye w/ direction changing nystagmus w/ horizontal gaze        Test of Skew -  Normal         Head Impulse Test - Normal         Saccades - R eye saccadic dysmetria w/ hypometric vertical & horizontal saccades      Braces/Orthotics/Lines/Etc:   · O2 Device: Room air  Treatment/Session Assessment:    · Response to Treatment:  Gait x 90 feet wearing eye patch  · Interdisciplinary Collaboration:   o Physical Therapy Assistant  o Registered Nurse  · After treatment position/precautions:   o Supine in bed  o Bed/Chair-wheels locked  o Bed in low position  o Call light within reach  o RN notified   · Compliance with Program/Exercises: Compliant all of the time  · Recommendations/Intent for next treatment session: \"Next visit will focus on advancements to more challenging activities and reduction in assistance provided\".   Total Treatment Duration:  PT Patient Time In/Time Out  Time In: 1300  Time Out: 1324    Quiana Young-Darrin, PTA

## 2020-02-07 NOTE — PROGRESS NOTES
Hospitalist Progress Note    2020  Admit Date: 2020 10:26 PM   NAME: Mary Campos   :  1970   MRN:  [id number]   Attending: Tiki Donnelly MD  PCP:  None    SUBJECTIVE:   Patient is a 52yo F with a history of HTN who presented to ER with complaint of sudden onset dizziness. Recently treated for scalp allergic reaction to hair dye with predisone and benadryl. : MRI pending, neuro following. Feeling much better. Nystagmus and dizziness still present but much improved. Review of Systems negative with exception of pertinent positives noted above  PHYSICAL EXAM     Visit Vitals  /66   Pulse 72   Temp 98.3 °F (36.8 °C)   Resp 18   Ht 5' 9.5\" (1.765 m)   Wt 105.2 kg (232 lb)   SpO2 96%   BMI 33.77 kg/m²      Temp (24hrs), Av.1 °F (36.7 °C), Min:98 °F (36.7 °C), Max:98.3 °F (36.8 °C)    Oxygen Therapy  O2 Sat (%): 96 % (20 1144)  Pulse via Oximetry: 69 beats per minute (20 0429)  O2 Device: Room air (20 1330)    Intake/Output Summary (Last 24 hours) at 2020 1336  Last data filed at 2020 0446  Gross per 24 hour   Intake    Output 700 ml   Net -700 ml      General: No acute distress    Lungs:  CTA Bilaterally. Heart:  Regular rate and rhythm,  No murmur, rub, or gallop  Abdomen: Soft, Non distended, Non tender, Positive bowel sounds  Extremities: No cyanosis, clubbing or edema  Neurologic:  Horizontal nystagmus    XR CHEST SNGL V   Final Result   IMPRESSION: No consolidation. CTA HEAD NECK W WO CONT   Final Result   IMPRESSION:   1. Negative CTA exam of the major cervical arterial vasculature for significant   stenosis or occlusion. 2. Negative CTA exam of the major intracranial arterial vasculature for   significant stenosis, occlusion, or aneurysms. MRI BRAIN WO CONT   Final Result   IMPRESSION:      1. No acute infarction. 2. Scattered nonspecific white matter hyperintensities present as described.             CT HEAD WO CONT   Final Result IMPRESSION:      1. No CT evidence of acute intracranial abnormality. MRI BRAIN W WO CONT    (Results Pending)         [address] Problems    Diagnosis Date Noted    Nystagmus 02/06/2020    CVA (cerebral vascular accident) ([facility] [address] 02/05/2020     Plan:    Vertigo: possibly central cause, MRI with contrast pending per neurology. Symptom control much better with meclizine   Appreciate neurology consultation    HTN: severely elevated at times, resume home meds.     Allergic reaction: improved, discontinue prednisone    DVT Prophylaxis: HSQ  Dispo: pending results     Signed By: Karl Blancas MD     February 7, 2020

## 2020-02-07 NOTE — MED STUDENT NOTES
ATTENTION: 
MEDICAL STUDENT NOTE 
NOT FOR CLINICAL USE 
SEE ATTENDING NOTES Medical Student Progress Note SUBJECTIVE:  
Destiny mott 44 y.o. female with a history of HTN who was admitted on 2/4/2020 for dizziness and nausea. She was first seen on 02/02 due to an allergic reaction on her head and face to a new hair dye. She presented on 02/02 with head blisters, swelling, eye swelling, and itchy throat. She was treated with toradol and prednisone and discharged home with prednisone and benadryl. The swelling improved, but on 02/04 she was sitting on her couch at home when she suddenly felt hot, started sweating profusely, had dizziness, and double/blurry vision. Also admitted to nausea, vomiting (no blood), headaches, difficulty walking due to dizziness, urinary frequency, increased thirst, and marijuana use 3-4x/week. Denied CP, SOB, abdominal pain, diarrhea, melena, alcohol intake. 02/07/20: Doing much better today. Double/blurry vision, nausea, dizziness have resolved. States that only has dizziness when moves too quickly. Last episode of dizziness and nausea was yesterday morning. Cough is getting better with the mucinex. Denies SOB, CP, headaches. ROS: negative except for the positives above. OBJECTIVE Visit Vitals /83 Pulse 70 Temp 98.1 °F (36.7 °C) Resp 18 Ht 5' 9.5\" (1.765 m) Wt 105.2 kg (232 lb) SpO2 97% BMI 33.77 kg/m² General: NAD HEENT: Horizontal nystagmus, although milder than yesterday Lungs:  CTAB Heart:  RRR Abdomen: Soft, non-tender Extremities: No edema Skin  Warm and dry Neurologic:  AAOx3 Pertinent labs, radiology: MRI pending CBC, CMP pending Flu and CXR negative ASSESSMENT/PLAN Destiny mott 44 y.o. female with a history of HTN who was admitted on 2/4/2020 for dizziness and nausea. Dizziness Neurology following - PT examined; repeat MRI today Low salt diet Zofran PRN Meclizine PRN 
  
 Hypertensive emergency Controlled Labetalol PRN for SBP >220 or DBP >120 Hyralazine 75 mg TID Lipitor 80 mg daily Coreg 25 mg BID Chlorthalidone 25 mg daily Norvasc 10 mg daily Cozaar 100 mg daily 
  
Allergic reaction Improved 
  
Wheezing Improved CXR and flu - negative Signed By: Roel Lockett February 7, 2020 ATTENTION: 
MEDICAL STUDENT NOTE 
NOT FOR CLINICAL USE 
SEE ATTENDING NOTES *ATTENTION:  This note has been created by a medical student for educational purposes only. Please do not refer to the content of this note for clinical decision-making, billing, or other purposes. Please see attending physicians note to obtain clinical information on this patient. *

## 2020-02-07 NOTE — PROGRESS NOTES
MRI of brain reviewed and demonstrated right dorsolateral medullary infarct. Will start on DAPT with ASA 81 mg po daily and Plavix 75mg po daily.

## 2020-02-07 NOTE — PROGRESS NOTES
Spoke with Jason Harden in MRI, original consent for previous MRI is satisfactory for the hospital stay.

## 2020-02-07 NOTE — PROGRESS NOTES
END OF SHIFT NOTE    INTAKE/OUTPUT  02/05 0701 - 02/06 0700  In: 250 [P.O.:250]  Out: 1700 [Urine:1700]  Voiding: YES  Catheter: NO  Drain:              Flatus: Patient does have flatus present. Stool:  1 occurrences. Characteristics:       Emesis: 0 occurrences. Characteristics:        VITAL SIGNS  Patient Vitals for the past 12 hrs:   Temp Pulse Resp BP SpO2   02/06/20 1110 97.7 °F (36.5 °C) 81 18 117/84 97 %   02/06/20 1033 97.9 °F (36.6 °C) 85  125/70        Pain Assessment  Pain Intensity 1: 0 (02/06/20 1300)        Patient Stated Pain Goal: 0    Ambulating  Yes    Shift report given to oncoming nurse at the bedside.     Delores Hood RN

## 2020-02-08 VITALS
HEART RATE: 84 BPM | DIASTOLIC BLOOD PRESSURE: 78 MMHG | WEIGHT: 232 LBS | BODY MASS INDEX: 33.21 KG/M2 | OXYGEN SATURATION: 98 % | SYSTOLIC BLOOD PRESSURE: 119 MMHG | RESPIRATION RATE: 16 BRPM | HEIGHT: 70 IN | TEMPERATURE: 98.1 F

## 2020-02-08 LAB — GLUCOSE BLD STRIP.AUTO-MCNC: 120 MG/DL (ref 65–100)

## 2020-02-08 PROCEDURE — 74011250637 HC RX REV CODE- 250/637: Performed by: FAMILY MEDICINE

## 2020-02-08 PROCEDURE — 74011250637 HC RX REV CODE- 250/637: Performed by: INTERNAL MEDICINE

## 2020-02-08 PROCEDURE — 82962 GLUCOSE BLOOD TEST: CPT

## 2020-02-08 PROCEDURE — 74011250637 HC RX REV CODE- 250/637: Performed by: NURSE PRACTITIONER

## 2020-02-08 PROCEDURE — 99232 SBSQ HOSP IP/OBS MODERATE 35: CPT | Performed by: PSYCHIATRY & NEUROLOGY

## 2020-02-08 PROCEDURE — 74011250636 HC RX REV CODE- 250/636: Performed by: INTERNAL MEDICINE

## 2020-02-08 RX ORDER — GUAIFENESIN 100 MG/5ML
81 LIQUID (ML) ORAL DAILY
Qty: 30 TAB | Refills: 0 | Status: SHIPPED | OUTPATIENT
Start: 2020-02-09

## 2020-02-08 RX ORDER — MECLIZINE HCL 12.5 MG 12.5 MG/1
12.5 TABLET ORAL
Qty: 60 TAB | Refills: 0 | Status: SHIPPED | OUTPATIENT
Start: 2020-02-08 | End: 2020-02-18

## 2020-02-08 RX ORDER — CLOPIDOGREL BISULFATE 75 MG/1
75 TABLET ORAL DAILY
Qty: 20 TAB | Refills: 0 | Status: SHIPPED | OUTPATIENT
Start: 2020-02-09 | End: 2020-02-29

## 2020-02-08 RX ADMIN — ENOXAPARIN SODIUM 40 MG: 40 INJECTION SUBCUTANEOUS at 06:20

## 2020-02-08 RX ADMIN — Medication 10 ML: at 14:24

## 2020-02-08 RX ADMIN — HYDRALAZINE HYDROCHLORIDE 75 MG: 50 TABLET, FILM COATED ORAL at 09:05

## 2020-02-08 RX ADMIN — HYDRALAZINE HYDROCHLORIDE 75 MG: 50 TABLET, FILM COATED ORAL at 17:03

## 2020-02-08 RX ADMIN — Medication 5 ML: at 06:20

## 2020-02-08 RX ADMIN — CARVEDILOL 25 MG: 25 TABLET, FILM COATED ORAL at 09:05

## 2020-02-08 RX ADMIN — CARVEDILOL 25 MG: 25 TABLET, FILM COATED ORAL at 17:03

## 2020-02-08 RX ADMIN — AMLODIPINE BESYLATE 10 MG: 10 TABLET ORAL at 09:05

## 2020-02-08 RX ADMIN — GUAIFENESIN 1200 MG: 600 TABLET ORAL at 09:05

## 2020-02-08 RX ADMIN — CHLORTHALIDONE 25 MG: 25 TABLET ORAL at 09:05

## 2020-02-08 RX ADMIN — ASPIRIN 81 MG 81 MG: 81 TABLET ORAL at 09:05

## 2020-02-08 RX ADMIN — LOSARTAN POTASSIUM 100 MG: 50 TABLET, FILM COATED ORAL at 09:05

## 2020-02-08 RX ADMIN — CLOPIDOGREL BISULFATE 75 MG: 75 TABLET, FILM COATED ORAL at 09:05

## 2020-02-08 NOTE — DISCHARGE SUMMARY
Hospitalist Discharge Summary     Patient ID:  Delphine Nassar  [id number]  89 y.o.  1970  Admit date: 2/4/2020 10:26 PM  Discharge date and time: 2/8/2020  Attending: Thelda Cushing, MD  PCP:  None  Treatment Team: Attending Provider: Thelda Cushing, MD; Care Manager: Holger Kaye; Consulting Provider: Reyna Chua; Primary Nurse: Alexander Cardona    Principal Diagnosis CVA (cerebral vascular accident) [facility])   Principal Problem:    CVA (cerebral vascular accident) [facility]) (2/5/2020)    Active Problems:    Nystagmus (2/6/2020)             Hospital Course:  Please refer to the admission H&P for details of presentation. In summary, the patient is a 52yo F with a history of HTN who presented to the ER with complaint of sudden onset dizziness. Initial CVA workup unrevealing but patient with persistent vertigo. Neurology following and contrasted MRI did show small acute CVA. Vertigo much improved but some episodes of dizziness persist.  Meclizine helpful. Neuro started aspirin and will also dose plavix x21 days. Significant Diagnostic Studies:   MRI BRAIN W WO CONT   Final Result   Impression:   1. 4 mm focus of restricted diffusion within the right dorsolateral aspect of   the medulla, most concerning for an acute infarction. 2. Stable scattered foci of T2 hyperintensity elsewhere within the   supratentorial white matter. Please see above. XR CHEST SNGL V   Final Result   IMPRESSION: No consolidation. CTA HEAD NECK W WO CONT   Final Result   IMPRESSION:   1. Negative CTA exam of the major cervical arterial vasculature for significant   stenosis or occlusion. 2. Negative CTA exam of the major intracranial arterial vasculature for   significant stenosis, occlusion, or aneurysms. MRI BRAIN WO CONT   Final Result   IMPRESSION:      1. No acute infarction. 2. Scattered nonspecific white matter hyperintensities present as described.             CT HEAD WO CONT   Final Result   IMPRESSION:      1. No CT evidence of acute intracranial abnormality. Labs: Results:       Chemistry Recent Labs     02/06/20  0531   *      K 3.1*      CO2 28   BUN 17   CREA 0.86   CA 8.6   AGAP 5*      CBC w/Diff Recent Labs     02/06/20  0531   WBC 13.3*   RBC 4.80   HGB 13.7   HCT 41.8         Cardiac Enzymes No results for input(s): CPK, CKND1, SUZANNA in the last 72 hours. No lab exists for component: CKRMB, TROIP   Coagulation No results for input(s): PTP, INR, APTT, INREXT in the last 72 hours. Lipid Panel Lab Results   Component Value Date/Time    Cholesterol, total 121 02/06/2020 05:31 AM    HDL Cholesterol 43 02/06/2020 05:31 AM    LDL, calculated 53.4 02/06/2020 05:31 AM    VLDL, calculated 24.6 (H) 02/06/2020 05:31 AM    Triglyceride 123 02/06/2020 05:31 AM    CHOL/HDL Ratio 2.8 02/06/2020 05:31 AM      BNP No results for input(s): BNPP in the last 72 hours. Liver Enzymes No results for input(s): TP, ALB, TBIL, AP, SGOT, GPT in the last 72 hours. No lab exists for component: DBIL   Thyroid Studies Lab Results   Component Value Date/Time    T4, Total 9.5 02/07/2020 11:23 AM    TSH 0.692 02/07/2020 11:23 AM            Discharge Exam:  Visit Vitals  /78   Pulse 84   Temp 98.1 °F (36.7 °C)   Resp 16   Ht 5' 9.5\" (1.765 m)   Wt 105.2 kg (232 lb)   SpO2 98%   BMI 33.77 kg/m²     General appearance: alert, cooperative, no distress, appears stated age   Lungs: clear to auscultation bilaterally  Heart: regular rate and rhythm, S1, S2 normal, no murmur, click, rub or gallop  Abdomen: soft, non-tender. Bowel sounds normal. No masses,  no organomegaly  Extremities: no cyanosis or edema  Neurologic: nystagmus with right gaze    Disposition: home   Discharge Condition: stable  Patient Instructions:   Current Discharge Medication List      START taking these medications    Details   aspirin 81 mg chewable tablet Take 1 Tab by mouth daily.   Qty: 30 Tab, Refills: 0      clopidogreL (PLAVIX) 75 mg tab Take 1 Tab by mouth daily for 20 days. Indications: prevention for a blood clot going to the brain  Qty: 20 Tab, Refills: 0      meclizine (ANTIVERT) 12.5 mg tablet Take 1 Tab by mouth every six (6) hours as needed for Dizziness for up to 10 days. Qty: 60 Tab, Refills: 0         CONTINUE these medications which have NOT CHANGED    Details   hydrALAZINE (APRESOLINE) 50 mg tablet Take 75 mg by mouth three (3) times daily. Indications: patient ta      atorvastatin (LIPITOR) 80 mg tablet Take 80 mg by mouth nightly. carvediloL (COREG) 25 mg tablet Take 25 mg by mouth two (2) times daily (with meals). chlorthalidone (HYGROTEN) 25 mg tablet Take 25 mg by mouth daily. amLODIPine (NORVASC) 10 mg tablet Take  by mouth daily. losartan (COZAAR) 100 mg tablet Take 100 mg by mouth daily. cloNIDine (CATAPRESS) 0.2 mg tablet Take 1 Tab by mouth two (2) times a day. Qty: 40 Tab, Refills: 0         STOP taking these medications       HYDROcodone-acetaminophen (NORCO) 5-325 mg per tablet Comments:   Reason for Stopping:         predniSONE (DELTASONE) 50 mg tablet Comments:   Reason for Stopping:               Activity: PT/OT per Home Health  Diet: Cardiac Diet    Follow-up:     Follow-up Appointments   Procedures    FOLLOW UP VISIT Appointment in: Other (Specify) PCP 1w, neuro ([facility] [place] [facility] [place]) 1mo     PCP 1w, neuro ([facility] [place] [facility] [place]) 1mo     Standing Status:   Standing     Number of Occurrences:   1     Order Specific Question:   Appointment in     Answer:    Other (Specify)           Time spent to discharge patient 35 minutes  Signed:  Tristen Little MD  2/8/2020  10:19 AM

## 2020-02-08 NOTE — PROGRESS NOTES
Neurology Daily Progress Note     Assessment:     44year old with imbalance and nystagmus. Initial MRI read as normal.  HINTS exam, which is more sensitive than early MRI, suggested central cause. 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 Plan:     Continue aspirin and Plavix for 21 days and then continue aspirin monotherapy thereafter. High intensity statin    Continue to work with physical therapy. We appreciate their assistance, especially with HINTS exam     Symptomatic treatment for vertigo, if needed      No need for permissive hypertension. Patient needs improved control of blood pressure long-term, with long-term goal <130/80    Neurology will sign off. She can either see me in clinic or Christin Romero NP. Subjective: Interval history:    Dizziness has essentially resolved. No double vision. She is getting close to her neurological baseline. History:    Clover Ferguson is a 44 y.o. female who is being seen for dizziness. Review of systems negative with exception of pertinent positives and negatives noted above.        Objective:     Vitals:    02/07/20 2135 02/07/20 2246 02/08/20 0247 02/08/20 0743   BP: 117/55 112/71 108/67 119/78   Pulse: 80 86 90 84   Resp: 16 16 16 16   Temp: 98.8 °F (37.1 °C) 98 °F (36.7 °C) 97.7 °F (36.5 °C) 98.1 °F (36.7 °C)   SpO2: 97% 97% 97% 98%   Weight:       Height:              Current Facility-Administered Medications:     phenol throat spray (CHLORASEPTIC) 1 Spray, 1 Spray, Oral, PRN, Harvey Dalton MD, 1 [place] at 02/07/20 0446    clopidogreL (PLAVIX) tablet 75 mg, 75 mg, Oral, DAILY, Arcelia Jones NP    ondansetron (ZOFRAN ODT) tablet 8 mg, 8 mg, Oral, Q8H PRN, Fadia Gray MD, 8 mg at 02/06/20 1031    guaiFENesin ER (MUCINEX) tablet 1,200 mg, 1,200 mg, Oral, Q12H, Fadia Gray MD, 1,200 mg at 02/07/20 2141    meclizine (ANTIVERT) tablet 12.5 mg, 12.5 mg, Oral, Q6H PRN, Fadia Gray MD, 12.5 mg at 02/07/20 0945   HYDROcodone-acetaminophen (NORCO) 5-325 mg per tablet 1 Tab, 1 Tab, Oral, Q4H PRN, Rudy Hernandez MD    sodium chloride (NS) flush 5-40 mL, 5-40 mL, IntraVENous, Q8H, Ruth Sorensen MD, 5 mL at 02/08/20 0620    sodium chloride (NS) flush 5-40 mL, 5-40 mL, IntraVENous, PRN, Rudy Hernandez MD    aspirin chewable tablet 81 mg, 81 mg, Oral, DAILY, Ruth Sorensen MD, 81 mg at 02/07/20 0952    atorvastatin (LIPITOR) tablet 40 mg, 40 mg, Oral, QHS, Ruth Sorensen MD, 40 mg at 02/07/20 2142    acetaminophen (TYLENOL) tablet 650 mg, 650 mg, Oral, Q4H PRN, Rudy Hernandez MD    labetaloL (NORMODYNE;TRANDATE) injection 5 mg, 5 mg, IntraVENous, Q10MIN PRN, Rudy Hernandez MD    enoxaparin (LOVENOX) injection 40 mg, 40 mg, SubCUTAneous, Q24H, Ruth Sorensen MD, 40 mg at 02/08/20 0620    amLODIPine (NORVASC) tablet 10 mg, 10 mg, Oral, DAILY, Josefina Elmore MD, 10 mg at 02/07/20 1694    carvediloL (COREG) tablet 25 mg, 25 mg, Oral, BID WITH MEALS, Josefina Elmore MD, 25 mg at 02/07/20 1746    chlorthalidone (HYGROTEN) tablet 25 mg, 25 mg, Oral, DAILY, Josefina Elmore MD, 25 mg at 02/07/20 3571    hydrALAZINE (APRESOLINE) tablet 75 mg, 75 mg, Oral, TID, Josefina Elmore MD, 75 mg at 02/07/20 2141    losartan (COZAAR) tablet 100 mg, 100 mg, Oral, DAILY, Josefina Elmore MD, 100 mg at 02/07/20 1409    No results found for this or any previous visit (from the past 12 hour(s)). Physical Exam:  General - Well developed, well nourished, in no apparent distress. Pleasant and conversent. HEENT - Normocephalic, atraumatic. Conjunctiva are clear. Neck - Supple without masses  Cardiovascular - Regular rate and rhythm. Normal S1, S2 without murmurs, rubs, or gallops. Lungs - Clear to auscultation. Abdomen - Soft, nontender with normal bowel sounds. Extremities - Peripheral pulses intact. No edema and no rashes. Neurological examination - Comprehension, attention, memory and reasoning are intact.  Language and speech are normal.   On cranial nerve examination, (II, III, IV, VI) pupils are equal, round, and reactive to light. Visual acuity is adequate. Visual fields are full to finger confrontation. Gaze is conjugate. Nystagmus when looking to the right.  (V, VII) Face is symmetric and sensation is intact to light touch. (VIII) Hearing is intact. (IX, X) Palate and uvula elevate symmetrically. Voice is normal. (XI) Shoulder shrug is strong and equal bilaterally. (XII)Tongue is midline. Motor examination - There is normal muscle tone and bulk. Power is full throughout. Muscle stretch reflexes are normoactive and there are no pathological reflexes present. Plantar response is flexor. Sensation is intact to light touch, pinprick, vibration and proprioception in all extremities.  Cerebellar examination is normal.      Signed By: Marilee aJimes DO     February 8, 2020

## 2020-02-08 NOTE — PROGRESS NOTES
END OF SHIFT NOTE:    INTAKE/OUTPUT  02/06 0701 - 02/07 0700  In: 240 [P.O.:240]  Out: 700 [Urine:700]  Voiding: YES  Catheter: NO  Color: clear  Drain:              DIET  Cardiac   Appeared to be eating fried chicken this evening. Flatus: Patient does have flatus present. Stool:  0 occurrences. Characteristics:       Ambulating  Yes    Emesis: 0 occurrences.     Characteristics:          VITAL SIGNS  Patient Vitals for the past 12 hrs:   Temp Pulse Resp BP SpO2   02/07/20 1859 98.3 °F (36.8 °C) 95 18 111/67 99 %   02/07/20 1741 98.6 °F (37 °C) 89  120/71 99 %   02/07/20 1144 98.3 °F (36.8 °C) 72 18 105/66 96 %       Pain Assessment  Pain Intensity 1: 0 (02/07/20 1300)        Patient Stated Pain Goal: 0            Jacob Amador RN

## 2020-02-08 NOTE — PROGRESS NOTES
END OF SHIFT NOTE:    INTAKE/OUTPUT  02/07 0701 - 02/08 0700  In: -   Out: 1050 [Urine:1050]  Voiding: YES  Catheter: NO  Drain:              Flatus: Patient does not have flatus present. Stool:  0 occurrences. Characteristics:       Emesis: 0 occurrences. Characteristics:        VITAL SIGNS  Patient Vitals for the past 12 hrs:   Temp Pulse Resp BP SpO2   02/08/20 0247 97.7 °F (36.5 °C) 90 16 108/67 97 %   02/07/20 2246 98 °F (36.7 °C) 86 16 112/71 97 %   02/07/20 2135 98.8 °F (37.1 °C) 80 16 117/55 97 %       Pain Assessment  Pain Intensity 1: 0 (02/07/20 1500)        Patient Stated Pain Goal: 0    Ambulating  Yes with help. No nystagmus or double vision reported tonight. Shift report given to oncoming nurse at the bedside.     Sofia Mejia RN

## 2020-02-08 NOTE — PROGRESS NOTES
[facility]  Face to Face Encounter    Patients Name: Vivian Chaves    YOB: 1970    Ordering Physician:Dr. Coni Mendez    Primary Diagnosis: CVA (cerebral vascular accident) [facility]) [I63.9]  Nystagmus [H55.00]    Date of Face to Face:   2/8/2020                                  Face to Face Encounter findings are related to primary reason for home care:   yes. 1. I certify that the patient needs intermittent care as follows: physical therapy: strengthening  occupational therapy:  ADL safety (ie. cooking, bathing, dressing)    2. I certify that this patient is homebound, that is: 1) patient requires the use of a walker device, special transportation, or assistance of another to leave the home; or 2) patient's condition makes leaving the home medically contraindicated; and 3) patient has a normal inability to leave the home and leaving the home requires considerable and taxing effort. Patient may leave the home for infrequent and short duration for medical reasons, and occasional absences for non-medical reasons. Homebound status is due to the following functional limitations: Patient with strength deficits limiting the performance of all ADL's without caregiver assistance or the use of an assistive device. 3. I certify that this patient is under my care and that I, or a nurse practitioner or  [id number], or clinical nurse specialist, or certified nurse midwife, working with me, had a Face-to-Face Encounter that meets the physician Face-to-Face Encounter requirements.   The following are the clinical findings from the [address] [place] [address] encounter that support the need for skilled services and is a summary of the encounter: [facility] Records    See discharge summary      Nils Hernandez  2/8/2020      THE FOLLOWING TO BE COMPLETED BY THE COMMUNITY PHYSICIAN:    I concur with the findings described above from the [facility] encounter that this patient is homebound and in need of a skilled service.     Certifying Physician: _____________________________________      Printed Certifying Physician Name: _____________________________________    Date: _________________

## 2020-02-08 NOTE — PROGRESS NOTES
Pt discharged from unit with belongings and RXs. Accompanied by hospital personnel. Pt transported downstairs via wheelchair. No distress noted at discharge.

## 2020-02-08 NOTE — PROGRESS NOTES
Message sent via perfect serve to MDs regarding MRI results. No new orders. Pt has no deficits except for  Intermittent Left eye double vision. Velma when she looks far to the left. Continues to have strong  in hands/good leg strength and no problems with her speech.

## 2020-02-08 NOTE — PROGRESS NOTES
Patient d/c home with [facility] / medication voucher with [facility] on [address]. All information faxed over. Patient agreeable with the d/c and states she do has other medication in the home. Patient is active with [facility]. Patient family will transport patient home. CM will continue to monitor. Care Management Interventions  PCP Verified by CM: Yes(Pt goes to [facility])  Mode of Transport at Discharge:  Other (see comment)(family)  Transition of Care Consult (CM Consult): Discharge Planning  Discharge Durable Medical Equipment: No  Physical Therapy Consult: Yes  Occupational Therapy Consult: Yes  Speech Therapy Consult: Yes  Current Support Network: Own Home, Other(Pt and son live together)  Confirm Follow Up Transport: Family  The Plan for Transition of Care is Related to the Following Treatment Goals : home  The Patient and/or Patient Representative was Provided with a Choice of Provider and Agrees with the Discharge Plan?: (N/A)  Name of the Patient Representative Who was Provided with a Choice of Provider and Agrees with the Discharge Plan: (N.A)  Freedom of Choice List was Provided with Basic Dialogue that Supports the Patient's Individualized Plan of Care/Goals, Treatment Preferences and Shares the Quality Data Associated with the Providers?: (N/A)  The [employer] Information Provided?: No  Discharge Location  Discharge Placement: Home

## 2020-02-08 NOTE — DISCHARGE INSTRUCTIONS
DISCHARGE SUMMARY from Nurse    PATIENT INSTRUCTIONS:    After general anesthesia or intravenous sedation, for 24 hours or while taking prescription Narcotics:  · Limit your activities  · Do not drive and operate hazardous machinery  · Do not make important personal or business decisions  · Do  not drink alcoholic beverages  · If you have not urinated within 8 hours after discharge, please contact your surgeon on call. Report the following to your surgeon:  · Excessive pain, swelling, redness or odor of or around the surgical area  · Temperature over 100.5  · Nausea and vomiting lasting longer than 4 hours or if unable to take medications  · Any signs of decreased circulation or nerve impairment to extremity: change in color, persistent  numbness, tingling, coldness or increase pain  · Any questions    What to do at Home:  Recommended activity: Activity as tolerated. If you experience any of the following symptoms dizziness, vision changes, weakness, or blurred vision please follow up with PCP. *  Please give a list of your current medications to your Primary Care Provider. *  Please update this list whenever your medications are discontinued, doses are      changed, or new medications (including over-the-counter products) are added. *  Please carry medication information at all times in case of emergency situations. These are general instructions for a healthy lifestyle:    No smoking/ No tobacco products/ Avoid exposure to second hand smoke  Surgeon General's Warning:  Quitting smoking now greatly reduces serious risk to your health.     Obesity, smoking, and sedentary lifestyle greatly increases your risk for illness    A healthy diet, regular physical exercise & weight monitoring are important for maintaining a healthy lifestyle    You may be retaining fluid if you have a history of heart failure or if you experience any of the following symptoms:  Weight gain of 3 pounds or more overnight or 5 pounds in a week, increased swelling in our hands or feet or shortness of breath while lying flat in bed. Please call your doctor as soon as you notice any of these symptoms; do not wait until your next office visit. The discharge information has been reviewed with the patient. The patient verbalized understanding. Discharge medications reviewed with the patient and appropriate educational materials and side effects teaching were provided. ___________________________________________________________________________________________________________________________________  Stroke: After Your Visit     Your Care Instructions     Risk factors for stroke include being overweight, smoking, and sedentary lifestyle. This means that the blood flow to a part of your brain was blocked for some time, which damages the nerve cells in that part of the brain. The part of your body controlled by that part of your brain may not function properly now. The brain is an amazing organ that can heal itself to some degree. The stroke you had damaged part of your brain, but other parts of your brain may take over in some way for the damaged areas. You have already started this process. Going home may be hard for you and your family. The more you can try to do for yourself, the better. Remember to take each day one at a time. Follow-up care is a key part of your treatment and safety. Be sure to make and go to all appointments, and call your doctor if you are having problems. Its also a good idea to know your test results and keep a list of the medicines you take. How can you care for yourself at home? Enter a stroke rehabilitation (rehab) program, if your doctor recommends it. Physical, speech, and occupational therapies can help you manage bathing, dressing, eating, and other basics of daily living. Eat a heart-healthy diet that is low in cholesterol, saturated fat, and salt.  Eat lots of fresh fruits and vegetables and foods high in fiber. Increase your activities slowly. Take short rest breaks when you get tired. Gradually increase the amount you walk. Start out by walking a little more than you did the day before. Do not drive until your doctor says it is okay. It is normal to feel sad or depressed after a stroke. If the blues last, talk to your doctor. If you are having problems with urine leakage, go to the bathroom at regular times, including when you first wake up and at bedtime. Also, limit fluids after dinner. If you are constipated, drink plenty of fluids, enough so that your urine is light yellow or clear like water. If you have kidney, heart, or liver disease and have to limit fluids, talk with your doctor before you increase the amount of fluids you drink. Set up a regular time for using the toilet. If you continue to have constipation, your doctor may suggest using a bulking agent, such as Metamucil, or a stool softener, laxative, or enema. Medicines  Take your medicines exactly as prescribed. Call your doctor if you think you are having a problem with your medicine. You may be taking several medicines. ACE (angiotensin-converting enzyme) inhibitors, angiotensin II receptor blockers (ARBs), beta-blockers, diuretics (water pills), and calcium channel blockers control your blood pressure. Statins help lower cholesterol. Your doctor may also prescribe medicines for depression, pain, sleep problems, anxiety, or agitation. If your doctor has given you medicine that prevents blood clots, such as warfarin (Coumadin), aspirin combined with extended-release dipyridamole (Aggrenox), clopidogrel (Plavix), or aspirin to prevent another stroke, you should:  Tell your dentist, pharmacist, and other health professionals that you take these medicines. Watch for unusual bruising or bleeding, such as blood in your urine, red or black stools, or bleeding from your nose or gums.   Get regular blood tests to check your clotting time if you are taking Coumadin. Wear medical alert jewelry that says you take blood thinners. You can buy this at most drugstores. Do not take any over-the-counter medicines or herbal products without talking to your doctor first.  If you take birth control pills or hormone replacement therapy, talk to your doctor about whether they are right for you. For family members and caregivers  Make the home safe. Set up a room so that your loved one does not have to climb stairs. Be sure the bathroom is on the same floor. Move throw rugs and furniture that could cause falls, and make sure that the lighting is good. Put grab bars and seats in tubs and showers. Find out what your loved one can do and what he or she needs help with. Try not to do things for your loved one that your loved one can do on his or her own. Help him or her learn and practice new skills. Visit and talk with your loved one often. Try doing activities together that you both enjoy, such as playing cards or board games. Keep in touch with your loved one's friends as much as you can, and encourage them to visit. Take care of yourself. Do not try to do everything yourself. Ask other family members to help. Eat well, get enough rest, and take time to do things that you enjoy. Keep up with your own doctor visits, and make sure to take your medicines regularly. Get out of the house as much as you can. Join a local support group. Find out if you qualify for home health care visits to help with rehab or for adult day care. When should you call for help? Call 911 anytime you think you may need emergency care. For example, call if:  You have signs of another stroke. These may include:  Sudden numbness, paralysis, or weakness in your face, arm, or leg, especially on only one side of your body. New problems with walking or balance. Sudden vision changes. Drooling or slurred speech.   New problems speaking or understanding simple statements, or you feel confused. A sudden, severe headache that is different from past headaches. Call 911 even if these symptoms go away in a few minutes. You cough up blood. You vomit blood or what looks like coffee grounds. You pass maroon or very bloody stools. Call your doctor now or seek immediate medical care if:  You have new bruises or blood spots under your skin. You have a nosebleed. Your gums bleed when you brush your teeth. You have blood in your urine. Your stools are black and tarlike or have streaks of blood. You have vaginal bleeding when you are not having your period, or heavy period bleeding. You have new symptoms that may be related to your stroke, such as falls or trouble swallowing. Watch closely for changes in your health, and be sure to contact your doctor if you have any problems. Where can you learn more? Go to [employer]    Enter C294  in the search box to learn more about \"Stroke: After Your Visit\". © 7205-8222 Healthwise, [employer]. Care instructions adapted under license by [employer] (which disclaims liability or warranty for this information). This care instruction is for use with your licensed healthcare professional. If you have questions about a medical condition or this instruction, always ask your healthcare professional. [employer] any warranty or liability for your use of this information.

## 2020-02-09 ENCOUNTER — HOME HEALTH ADMISSION (OUTPATIENT)
Dept: HOME HEALTH SERVICES | Facility: HOME HEALTH | Age: 50
End: 2020-02-09
Payer: SELF-PAY

## 2020-02-11 ENCOUNTER — HOME CARE VISIT (OUTPATIENT)
Dept: SCHEDULING | Facility: HOME HEALTH | Age: 50
End: 2020-02-11
Payer: SELF-PAY

## 2020-02-11 VITALS
DIASTOLIC BLOOD PRESSURE: 80 MMHG | RESPIRATION RATE: 17 BRPM | SYSTOLIC BLOOD PRESSURE: 152 MMHG | HEART RATE: 100 BPM | TEMPERATURE: 98.6 F

## 2020-02-11 PROCEDURE — 400013 HH SOC

## 2020-02-11 PROCEDURE — G0151 HHCP-SERV OF PT,EA 15 MIN: HCPCS

## 2020-02-12 ENCOUNTER — HOME CARE VISIT (OUTPATIENT)
Dept: HOME HEALTH SERVICES | Facility: HOME HEALTH | Age: 50
End: 2020-02-12
Payer: SELF-PAY

## 2020-02-13 ENCOUNTER — HOME CARE VISIT (OUTPATIENT)
Dept: SCHEDULING | Facility: HOME HEALTH | Age: 50
End: 2020-02-13
Payer: SELF-PAY

## 2020-02-13 VITALS
HEART RATE: 96 BPM | TEMPERATURE: 97.7 F | SYSTOLIC BLOOD PRESSURE: 150 MMHG | RESPIRATION RATE: 19 BRPM | DIASTOLIC BLOOD PRESSURE: 76 MMHG

## 2020-02-13 VITALS
TEMPERATURE: 98.6 F | DIASTOLIC BLOOD PRESSURE: 60 MMHG | RESPIRATION RATE: 14 BRPM | SYSTOLIC BLOOD PRESSURE: 106 MMHG | HEART RATE: 84 BPM

## 2020-02-13 PROCEDURE — G0157 HHC PT ASSISTANT EA 15: HCPCS

## 2020-02-13 PROCEDURE — G0152 HHCP-SERV OF OT,EA 15 MIN: HCPCS

## 2020-02-17 ENCOUNTER — HOME CARE VISIT (OUTPATIENT)
Dept: SCHEDULING | Facility: HOME HEALTH | Age: 50
End: 2020-02-17
Payer: SELF-PAY

## 2020-02-17 VITALS
DIASTOLIC BLOOD PRESSURE: 88 MMHG | TEMPERATURE: 97.5 F | HEART RATE: 76 BPM | RESPIRATION RATE: 19 BRPM | SYSTOLIC BLOOD PRESSURE: 140 MMHG

## 2020-02-17 PROCEDURE — G0157 HHC PT ASSISTANT EA 15: HCPCS

## 2020-02-19 ENCOUNTER — HOME CARE VISIT (OUTPATIENT)
Dept: SCHEDULING | Facility: HOME HEALTH | Age: 50
End: 2020-02-19
Payer: SELF-PAY

## 2020-02-19 VITALS
DIASTOLIC BLOOD PRESSURE: 86 MMHG | RESPIRATION RATE: 19 BRPM | HEART RATE: 92 BPM | SYSTOLIC BLOOD PRESSURE: 158 MMHG | TEMPERATURE: 97.4 F

## 2020-02-19 PROCEDURE — G0157 HHC PT ASSISTANT EA 15: HCPCS

## 2020-02-21 ENCOUNTER — HOME CARE VISIT (OUTPATIENT)
Dept: SCHEDULING | Facility: HOME HEALTH | Age: 50
End: 2020-02-21
Payer: SELF-PAY

## 2020-02-21 PROCEDURE — G0155 HHCP-SVS OF CSW,EA 15 MIN: HCPCS

## 2020-02-25 ENCOUNTER — HOME CARE VISIT (OUTPATIENT)
Dept: SCHEDULING | Facility: HOME HEALTH | Age: 50
End: 2020-02-25
Payer: SELF-PAY

## 2020-02-25 PROCEDURE — G0151 HHCP-SERV OF PT,EA 15 MIN: HCPCS
